# Patient Record
Sex: FEMALE | Race: WHITE | NOT HISPANIC OR LATINO | Employment: UNEMPLOYED | URBAN - METROPOLITAN AREA
[De-identification: names, ages, dates, MRNs, and addresses within clinical notes are randomized per-mention and may not be internally consistent; named-entity substitution may affect disease eponyms.]

---

## 2018-06-07 ENCOUNTER — OFFICE VISIT (OUTPATIENT)
Dept: URGENT CARE | Facility: PHYSICIAN GROUP | Age: 28
End: 2018-06-07
Payer: COMMERCIAL

## 2018-06-07 VITALS
TEMPERATURE: 99.3 F | RESPIRATION RATE: 14 BRPM | OXYGEN SATURATION: 98 % | HEIGHT: 63 IN | WEIGHT: 136 LBS | HEART RATE: 81 BPM | BODY MASS INDEX: 24.1 KG/M2 | DIASTOLIC BLOOD PRESSURE: 70 MMHG | SYSTOLIC BLOOD PRESSURE: 116 MMHG

## 2018-06-07 DIAGNOSIS — R59.0 LYMPHADENOPATHY, PREAURICULAR: ICD-10-CM

## 2018-06-07 DIAGNOSIS — H66.92 ACUTE OTITIS MEDIA, LEFT: Primary | ICD-10-CM

## 2018-06-07 PROCEDURE — 99203 OFFICE O/P NEW LOW 30 MIN: CPT | Performed by: PHYSICIAN ASSISTANT

## 2018-06-07 RX ORDER — AMOXICILLIN AND CLAVULANATE POTASSIUM 875; 125 MG/1; MG/1
1 TABLET, FILM COATED ORAL 2 TIMES DAILY
Qty: 20 TAB | Refills: 0 | Status: SHIPPED | OUTPATIENT
Start: 2018-06-07 | End: 2018-09-04

## 2018-06-07 RX ORDER — IBUPROFEN 800 MG/1
800 TABLET ORAL EVERY 6 HOURS PRN
Refills: 0 | COMMUNITY
Start: 2018-06-01 | End: 2018-09-04

## 2018-06-07 NOTE — PROGRESS NOTES
"Subjective:      Liane Martinez is a 27 y.o. female who presents with Otalgia (patient noticed a lump above left ear 3-4 days ago, no has severe ear pain)    PMH:  has a past medical history of Muscle disorder. She also has no past medical history of Allergy; Arthritis; or OSTEOPOROSIS.  MEDS:   Current Outpatient Prescriptions:   •   MG Tab, Take 800 mg by mouth every 6 hours as needed.  FOR PAIN, Disp: , Rfl: 0  ALLERGIES:   Allergies   Allergen Reactions   • Tylenol Nausea   • Vicodin [Hydrocodone-Acetaminophen] Vomiting     SURGHX: History reviewed. No pertinent surgical history.  SOCHX:  reports that she has never smoked. She has never used smokeless tobacco. She reports that she drinks alcohol. She reports that she does not use drugs.  FH: Reviewed with patient/family. Not pertinent to this complaint.          Otalgia    There is pain in the left ear. This is a new problem. Episode onset: 4 days. The problem occurs constantly. The problem has been gradually worsening. There has been no fever. The pain is at a severity of 8/10. Associated symptoms include headaches. Pertinent negatives include no coughing, ear discharge, hearing loss or rash. She has tried NSAIDs and heat packs for the symptoms. The treatment provided mild relief. There is no history of hearing loss.       Review of Systems   Constitutional: Negative for fever.   HENT: Positive for ear pain. Negative for congestion, ear discharge, hearing loss, sinus pain and tinnitus.    Respiratory: Negative for cough.    Skin: Negative for rash.   Neurological: Positive for headaches.   All other systems reviewed and are negative.         Objective:     /70   Pulse 81   Temp 37.4 °C (99.3 °F)   Resp 14   Ht 1.6 m (5' 3\")   Wt 61.7 kg (136 lb)   SpO2 98%   BMI 24.09 kg/m²      Physical Exam   Constitutional: She is oriented to person, place, and time. She appears well-developed and well-nourished. No distress.   HENT:   Head: " Normocephalic and atraumatic.   Right Ear: Tympanic membrane normal.   Left Ear: Tympanic membrane is injected and bulging. A middle ear effusion is present.   Ears:    Nose: Nose normal.   Mouth/Throat: Uvula is midline and oropharynx is clear and moist.   Eyes: Conjunctivae and EOM are normal. Pupils are equal, round, and reactive to light.   Neck: Normal range of motion. Neck supple.   Cardiovascular: Normal rate.    Pulmonary/Chest: Effort normal.   Musculoskeletal: Normal range of motion.   Lymphadenopathy:        Head (left side): Preauricular adenopathy present.     She has no cervical adenopathy.   Neurological: She is alert and oriented to person, place, and time.   Skin: Skin is warm and dry. Capillary refill takes less than 2 seconds.   Psychiatric: She has a normal mood and affect.   Nursing note and vitals reviewed.       Assessment/Plan:     1. Acute otitis media, left   MG Tab    amoxicillin-clavulanate (AUGMENTIN) 875-125 MG Tab   2. Lymphadenopathy, preauricular   MG Tab    amoxicillin-clavulanate (AUGMENTIN) 875-125 MG Tab        PT can continue OTC medications, increase fluids and rest until symptoms improve.     PT should follow up with PCP in 1-2 days for re-evaluation if symptoms have not improved.  Discussed red flags and reasons to return to UC or ED.  Pt and/or family verbalized understanding of diagnosis and follow up instructions and was offered informational handout on diagnosis.  PT discharged.

## 2018-06-10 ASSESSMENT — ENCOUNTER SYMPTOMS
COUGH: 0
HEADACHES: 1
FEVER: 0
SINUS PAIN: 0

## 2018-09-04 ENCOUNTER — OFFICE VISIT (OUTPATIENT)
Dept: URGENT CARE | Facility: PHYSICIAN GROUP | Age: 28
End: 2018-09-04
Payer: COMMERCIAL

## 2018-09-04 VITALS
WEIGHT: 136 LBS | TEMPERATURE: 98.9 F | DIASTOLIC BLOOD PRESSURE: 68 MMHG | SYSTOLIC BLOOD PRESSURE: 106 MMHG | OXYGEN SATURATION: 98 % | BODY MASS INDEX: 24.09 KG/M2 | RESPIRATION RATE: 18 BRPM | HEART RATE: 104 BPM

## 2018-09-04 DIAGNOSIS — N63.0 BREAST LUMP OR MASS: Primary | ICD-10-CM

## 2018-09-04 PROCEDURE — 99214 OFFICE O/P EST MOD 30 MIN: CPT | Performed by: PHYSICIAN ASSISTANT

## 2018-09-04 RX ORDER — IBUPROFEN 800 MG/1
800 TABLET ORAL EVERY 8 HOURS PRN
Qty: 30 TAB | Refills: 0 | Status: SHIPPED | OUTPATIENT
Start: 2018-09-04 | End: 2018-09-09

## 2018-09-04 ASSESSMENT — ENCOUNTER SYMPTOMS
MYALGIAS: 0
SORE THROAT: 0
COUGH: 0
CHILLS: 0
ANOREXIA: 0
CHANGE IN BOWEL HABIT: 0
FEVER: 0
DIARRHEA: 0
BREAST MASS: 1
ABDOMINAL PAIN: 0
SHORTNESS OF BREATH: 0
CONSTIPATION: 0
VOMITING: 0
WHEEZING: 0
NAUSEA: 0

## 2018-09-05 NOTE — PROGRESS NOTES
Subjective:   Liane Martinez is a 27 y.o. female who presents for Breast Pain (R breast tenderness, redness x1 day)    Breast Mass   This is a new problem. Episode onset: 2 day. The problem occurs constantly. The problem has been gradually improving. Pertinent negatives include no abdominal pain, anorexia, change in bowel habit, chills, congestion, coughing, fever, myalgias, nausea, sore throat or vomiting. Nothing aggravates the symptoms. She has tried nothing for the symptoms.       Exquisitely tender breast mass, redness is improving but pain is still present. Pt currently using contraception with Nexplon.     Review of Systems   Constitutional: Negative for chills, fever and malaise/fatigue.   HENT: Negative for congestion and sore throat.    Respiratory: Negative for cough, shortness of breath and wheezing.    Gastrointestinal: Negative for abdominal pain, anorexia, change in bowel habit, constipation, diarrhea, nausea and vomiting.   Musculoskeletal: Negative for myalgias.   All other systems reviewed and are negative.      PMH:  has a past medical history of Muscle disorder. She also has no past medical history of Allergy; Arthritis; or OSTEOPOROSIS.  MEDS:   Current Outpatient Prescriptions:   •  ibuprofen (MOTRIN) 800 MG Tab, Take 1 Tab by mouth every 8 hours as needed for up to 5 days., Disp: 30 Tab, Rfl: 0  ALLERGIES:   Allergies   Allergen Reactions   • Percocet [Oxycodone-Acetaminophen]    • Tylenol Nausea   • Vicodin [Hydrocodone-Acetaminophen] Vomiting     SURGHX: History reviewed. No pertinent surgical history.  SOCHX:  reports that she has never smoked. She has never used smokeless tobacco. She reports that she drinks alcohol. She reports that she does not use drugs.  Family History   Problem Relation Age of Onset   • Non-contributory Mother    • Non-contributory Father         Objective:   /68   Pulse (!) 104   Temp 37.2 °C (98.9 °F)   Resp 18   Wt 61.7 kg (136 lb)   SpO2 98%    BMI 24.09 kg/m²     Physical Exam   Constitutional: She is oriented to person, place, and time. She appears well-developed and well-nourished. No distress.   HENT:   Head: Normocephalic and atraumatic.   Eyes: Pupils are equal, round, and reactive to light. Conjunctivae are normal.   Cardiovascular: Normal rate and regular rhythm.    Pulmonary/Chest: Effort normal and breath sounds normal.       Neurological: She is alert and oriented to person, place, and time.   Skin: Skin is warm and dry.   Psychiatric: She has a normal mood and affect. Her behavior is normal.   Vitals reviewed.    Urine HCG: neg    Assessment/Plan:     1. Breast lump or mass  ibuprofen (MOTRIN) 800 MG Tab    US-BREAST COMPLETE-RIGHT     Rec'd warm compress and ibuprofen for pain. US pending will discuss final results with pt.     Differential diagnosis, natural history, supportive care discussed. Follow-up with primary care provider within 7-10 days. Red flags and emergency room precautions discussed.  Patient appears understanding of information.

## 2018-09-11 ENCOUNTER — HOSPITAL ENCOUNTER (OUTPATIENT)
Dept: RADIOLOGY | Facility: MEDICAL CENTER | Age: 28
End: 2018-09-11
Attending: PHYSICIAN ASSISTANT
Payer: COMMERCIAL

## 2018-09-11 DIAGNOSIS — N63.0 BREAST LUMP OR MASS: Primary | ICD-10-CM

## 2018-09-11 DIAGNOSIS — N63.0 BREAST LUMP OR MASS: ICD-10-CM

## 2018-09-11 PROCEDURE — 76642 ULTRASOUND BREAST LIMITED: CPT | Mod: RT

## 2018-09-11 NOTE — PROGRESS NOTES
[facility], pt had US of breast and will require further diagnostics including needle bx. Referral to [facility] initiated and patient informed. PCP referral initiated.

## 2018-09-25 ENCOUNTER — OFFICE VISIT (OUTPATIENT)
Dept: HEMATOLOGY ONCOLOGY | Facility: MEDICAL CENTER | Age: 28
End: 2018-09-25
Payer: COMMERCIAL

## 2018-09-25 VITALS
HEIGHT: 63 IN | DIASTOLIC BLOOD PRESSURE: 60 MMHG | TEMPERATURE: 98.9 F | WEIGHT: 122.47 LBS | BODY MASS INDEX: 21.7 KG/M2 | RESPIRATION RATE: 18 BRPM | SYSTOLIC BLOOD PRESSURE: 112 MMHG | HEART RATE: 104 BPM | OXYGEN SATURATION: 98 %

## 2018-09-25 DIAGNOSIS — N63.0 BREAST LUMP IN FEMALE: ICD-10-CM

## 2018-09-25 PROCEDURE — 99205 OFFICE O/P NEW HI 60 MIN: CPT | Performed by: NURSE PRACTITIONER

## 2018-09-25 RX ORDER — TRAMADOL HYDROCHLORIDE 50 MG/1
50 TABLET ORAL EVERY 4 HOURS PRN
COMMUNITY
End: 2022-02-22

## 2018-09-25 RX ORDER — CYCLOBENZAPRINE HCL 5 MG
5-10 TABLET ORAL 3 TIMES DAILY PRN
COMMUNITY
End: 2022-12-20

## 2018-09-25 ASSESSMENT — ENCOUNTER SYMPTOMS
NAUSEA: 0
CONSTIPATION: 0
SORE THROAT: 0
COUGH: 0
PALPITATIONS: 0
DIARRHEA: 0
INSOMNIA: 1
HEADACHES: 0
SHORTNESS OF BREATH: 0
WHEEZING: 0
FEVER: 0
WEIGHT LOSS: 0
CHILLS: 0
VOMITING: 0
NERVOUS/ANXIOUS: 1
DIZZINESS: 0

## 2018-09-25 ASSESSMENT — PAIN SCALES - GENERAL: PAINLEVEL: 4=SLIGHT-MODERATE PAIN

## 2018-09-25 NOTE — PROGRESS NOTES
Subjective:      Liane Martinez is a 28 y.o. female who presents with New Patient ([facility] New-Breast lump/Tiffanie Elena)        HPI    Patient referred to me, Intake Oncology Coordinator by Tiffanie Elena PA-C in Urgent Care for breast lump.  Patient is accompanied by her mother for today's visit.    Patient stated she was getting out of the shower one day and noticed a lump on her right breast.  She noticed approximately few weeks ago.  Initially she stated that it did hurt but there is no pain at this time.  There was some mild dimpling per patient.  She stated she was examined by a provider in the past but is not certain as to how long ago.  Based on this she was seen in the urgent care where she underwent examination and was sent for an ultrasound of the right breast.  Ultrasound shows a palpable lump that corresponds with a probable complicated cyst with a solid mass.  According to the reading radiologist due to the painful nature of this finding the results are somewhat inconclusive and recommendation for further evaluation.  Ultrasound was category 4A.  I personally reviewed the imaging report and films in detail as well as reviewed the patient and her mother today.    Overall patient is fairly healthy.  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  She is followed closely by [facility].  She does states she has some fatigue, but denies any fevers, chills or weight loss.  She denies any coughing, wheezing or shortness of breath.  She denies any chest pain, heart palpitations or swelling in her legs.  She is having normal formed bowel movements per her routine and denies any nausea or vomiting.     Obstetrics:   Age of first birth: N/A  Breast feed: N/A  Menarche age: 10  Birth control taken: Nexplanon x 3 years, Nuvaring x4-5 years  Menopause age: N/A  HRT: N/A    Please see past medical and surgical history below.    She did state that she does have a paternal grandmother that  "had ovarian cancer.    Patient is a never cigarette smoker.  She does have chronic pain and smokes marijuana daily.    Allergies   Allergen Reactions   • Percocet [Oxycodone-Acetaminophen]    • Tylenol Nausea   • Vicodin [Hydrocodone-Acetaminophen] Vomiting     No current outpatient prescriptions on file prior to visit.     No current facility-administered medications on file prior to visit.      Past Medical History:   Diagnosis Date   • Muscle disorder     \"hyperflexibility\" of joints      Past Surgical History:   Procedure Laterality Date   • DENTAL EXTRACTION(S)      wisdom     Family History   Problem Relation Age of Onset   • Non-contributory Mother    • Non-contributory Father    • Cancer Paternal Grandmother         Ovarian     Social History     Social History   • Marital status: Single     Spouse name: N/A   • Number of children: 0   • Years of education: N/A     Social History Main Topics   • Smoking status: Never Smoker   • Smokeless tobacco: Never Used   • Alcohol use Yes      Comment: occ   • Drug use: Yes     Types: Marijuana      Comment: for chronic pain    • Sexual activity: Yes     Partners: Male     Other Topics Concern   • Not on file     Social History Narrative   • No narrative on file       Review of Systems   Constitutional: Positive for malaise/fatigue. Negative for chills, fever and weight loss.   HENT: Negative for congestion and sore throat.    Respiratory: Negative for cough, shortness of breath and wheezing.    Cardiovascular: Negative for chest pain, palpitations and leg swelling.   Gastrointestinal: Negative for constipation, diarrhea, nausea and vomiting.   Genitourinary: Negative for dysuria.   Musculoskeletal:        Chronic left sided pain - on pain meds and sees specialist. New onset of right arm pain the last few weeks.   Skin: Negative for itching and rash.   Neurological: Negative for dizziness and headaches.   Psychiatric/Behavioral: The patient is nervous/anxious and has " "insomnia (chronic).           Objective:     /60 (BP Location: Right arm, Patient Position: Sitting, BP Cuff Size: Adult)   Pulse (!) 104   Temp 37.2 °C (98.9 °F) (Temporal)   Resp 18   Ht 1.6 m (5' 2.99\")   Wt 55.6 kg (122 lb 7.5 oz)   LMP 07/05/2018 (Within Days)   SpO2 98%   Breastfeeding? No   BMI 21.70 kg/m²      Physical Exam   Constitutional: She is oriented to person, place, and time. She appears well-developed and well-nourished. No distress.   HENT:   Head: Normocephalic and atraumatic.   Mouth/Throat: Oropharynx is clear and moist. No oropharyngeal exudate.   Eyes: Pupils are equal, round, and reactive to light. Conjunctivae and EOM are normal. Right eye exhibits no discharge. Left eye exhibits no discharge. No scleral icterus.   Neck: Normal range of motion. Neck supple. No thyromegaly present.   Cardiovascular: Normal rate, regular rhythm, normal heart sounds and intact distal pulses.  Exam reveals no gallop and no friction rub.    No murmur heard.  Pulmonary/Chest: Effort normal and breath sounds normal. No respiratory distress. She has no wheezes. Right breast exhibits tenderness. Right breast exhibits no inverted nipple, no mass, no nipple discharge and no skin change. Left breast exhibits no inverted nipple, no mass, no nipple discharge, no skin change and no tenderness.       Abdominal: Soft. Bowel sounds are normal. She exhibits no distension. There is no tenderness.   Musculoskeletal: Normal range of motion. She exhibits no edema or tenderness.   Lymphadenopathy:        Head (right side): No submental, no submandibular, no tonsillar, no preauricular, no posterior auricular and no occipital adenopathy present.        Head (left side): No submental, no submandibular, no tonsillar, no preauricular, no posterior auricular and no occipital adenopathy present.     She has no cervical adenopathy.        Right cervical: No superficial cervical, no deep cervical and no posterior cervical " adenopathy present.       Left cervical: No superficial cervical, no deep cervical and no posterior cervical adenopathy present.        Right: No supraclavicular adenopathy present.        Left: No supraclavicular adenopathy present.   Neurological: She is alert and oriented to person, place, and time.   Skin: Skin is warm and dry. No rash noted. She is not diaphoretic. No erythema. No pallor.   Psychiatric: She has a normal mood and affect. Her behavior is normal.   Vitals reviewed.         Us-breast Limited-right    Result Date: 9/11/2018 9/11/2018 10:44 AM HISTORY/REASON FOR EXAM:  Lump/Mass Painful palpable lump in the right breast 11:00 position for one week TECHNIQUE/EXAM DESCRIPTION AND NUMBER OF VIEWS: Right breast ultrasound. COMPARISON:   None FINDINGS:      Targeted ultrasound evaluation of the area of concern demonstrates a well-circumscribed hypoechoic mass with increased through transmission. This likely represents a complicated cyst with thick internal fluid. A solid mass such as a fibroadenoma would seem less likely. No axillary lymphadenopathy identified.     Palpable lump corresponds with probable complicated cyst with a solid mass such as a fibroadenoma seeming less likely. Due to painful nature of probable cyst as well as somewhat inconclusive findings recommend ultrasound-guided aspiration and potentially  biopsy if the apparent complicated cyst turns out to be solid. These results were given to the patient at the time of visit. Category 4A - Finding needing intervention but with a low suspicion for malignancy.       Assessment/Plan:     1. Breast lump in female  US-NEEDLE BX-BREAST INITIAL LESION     Plan  1.  Patient with a palpable lump of the right breast, with an ultrasound that shows it to be a probable complicated cyst with a solid mass recommending further evaluation.  According to the reading radiologist the findings are somewhat inconclusive and recommend for biopsy.  I discussed this  in detail with the patient and her mother today and recommended to proceed with a biopsy of the lump.  Patient did verbalize understanding and is in agreement the plan.  She is scheduled to have her biopsy on Thursday, September 27 and will follow up with me in the clinic on Monday, October 1 to review the results.    Spent 60 minutes in direct, face-to-face patient contact in which greater than 50% of the visit was spent counseling and coordinating of care.       Please note that this dictation was created using voice recognition software. I have made every reasonable attempt to correct obvious errors, but I expect that there are errors of grammar and possibly content that I did not discover before finalizing the note.

## 2018-09-27 ENCOUNTER — HOSPITAL ENCOUNTER (OUTPATIENT)
Dept: RADIOLOGY | Facility: MEDICAL CENTER | Age: 28
End: 2018-09-27
Attending: NURSE PRACTITIONER
Payer: COMMERCIAL

## 2018-09-27 DIAGNOSIS — N63.0 BREAST LUMP IN FEMALE: ICD-10-CM

## 2018-09-27 PROCEDURE — 19083 BX BREAST 1ST LESION US IMAG: CPT

## 2018-09-27 PROCEDURE — 88305 TISSUE EXAM BY PATHOLOGIST: CPT

## 2018-10-01 ENCOUNTER — OFFICE VISIT (OUTPATIENT)
Dept: HEMATOLOGY ONCOLOGY | Facility: MEDICAL CENTER | Age: 28
End: 2018-10-01
Payer: COMMERCIAL

## 2018-10-01 VITALS
HEIGHT: 63 IN | TEMPERATURE: 98.6 F | WEIGHT: 123.79 LBS | SYSTOLIC BLOOD PRESSURE: 114 MMHG | HEART RATE: 72 BPM | DIASTOLIC BLOOD PRESSURE: 64 MMHG | OXYGEN SATURATION: 100 % | RESPIRATION RATE: 18 BRPM | BODY MASS INDEX: 21.93 KG/M2

## 2018-10-01 DIAGNOSIS — N63.0 BREAST LUMP: ICD-10-CM

## 2018-10-01 PROCEDURE — 99213 OFFICE O/P EST LOW 20 MIN: CPT | Performed by: NURSE PRACTITIONER

## 2018-10-01 ASSESSMENT — PAIN SCALES - GENERAL: PAINLEVEL: 4=SLIGHT-MODERATE PAIN

## 2018-10-01 NOTE — Clinical Note
Hi there - this patient is establishing with you this Friday and wanted you to know that I have been seeing her and working her up for breast nodule which showed to be fibroadenoma. Just so you were aware when you see her.

## 2018-10-01 NOTE — PROGRESS NOTES
"Subjective:      Liane Martinez is a 28 y.o. female who presents for Follow-Up (Biopsy Results).        HPI    Patient seen today in follow-up for breast biopsy results.  She presents accompanied by her mother for today's visit.    She did seem to tolerate the biopsy well.  She does have Steri-Strips placed with some very mild bruising around the incision site.  She stated she had some tenderness with the biopsy but is doing well at this time.    Biopsy results showed a right breast ultrasound biopsy at the 11 o'clock position, 2 cm from the nipple show fragments of fibroadenoma at least measuring 6.5 mm in breath with surrounding dense fibrous breast tissue.  Biopsy specimen was negative for atypia and malignancy.    I personally reviewed the results in detail as well as reviewed with the patient and her mother today.  Patient was happy with the results of the pathology report.    Allergies   Allergen Reactions   • Percocet [Oxycodone-Acetaminophen]    • Tylenol Nausea   • Vicodin [Hydrocodone-Acetaminophen] Vomiting     Current Outpatient Prescriptions on File Prior to Visit   Medication Sig Dispense Refill   • etonogestrel (NEXPLANON) 68 MG Implant implant Inject 1 Each as instructed Once.     • cyclobenzaprine (FLEXERIL) 5 MG tablet Take 5-10 mg by mouth 3 times a day as needed.     • tramadol (ULTRAM) 50 MG Tab Take 50 mg by mouth every four hours as needed.       No current facility-administered medications on file prior to visit.      ROS       Objective:     /64 (BP Location: Right arm, Patient Position: Sitting, BP Cuff Size: Adult)   Pulse 72   Temp 37 °C (98.6 °F) (Temporal)   Resp 18   Ht 1.6 m (5' 2.99\")   Wt 56.1 kg (123 lb 12.6 oz)   LMP  (LMP Unknown)   SpO2 100%   Breastfeeding? Unknown   BMI 21.93 kg/m²      Physical Exam   Constitutional: She is oriented to person, place, and time. She appears well-developed and well-nourished.   Cardiovascular: Normal rate.  "   Pulmonary/Chest: Effort normal.   Musculoskeletal: Normal range of motion.   Neurological: She is alert and oriented to person, place, and time.   Skin: Skin is warm and dry. She is not diaphoretic.   Incision site of the right breast noted to have Steri-Strips with some mild bruising around it.  Incision site does appear to be clean.   Vitals reviewed.         FINAL DIAGNOSIS:    A. Right breast ultrasound biopsy mass, 11 o'clock, 2 cm from nipple:         Fragments of fibroadenoma, at least 6.5 mm in breadth, and          surrounding dense fibrous breast tissue.         Negative for atypia and malignancy.       Assessment/Plan:     1. Breast lump       Plan  1.  Patient with right breast ultrasound biopsy shows fragments of fibroadenoma measuring at least 6.5 mm in breadth with surrounding dense fibrous tissue noted.  There is no atypia or malignancy seen.  I did speak personally to Dr. Cox, breast surgeon who stated recommendation at this time would be a repeat ultrasound in approximately 6 months.  On ultrasound of the nodule measured approximately 1.29 cm in size.  I talked to the patient with regards to my conversation with Dr. Cox who verbalized understanding and is in agreement at this time.  I also let her know that should she start to notice the nodule grow or become too bothersome we can always refer her to Dr. Cox, as Dr. Cox has agreed to see the patient if requested.  At this time we will wait 6 months and have patient have a repeat ultrasound in approximately end of March 2019 for evaluation to confirm stability.  Patient is in agreement with the plan.

## 2018-10-05 ENCOUNTER — OFFICE VISIT (OUTPATIENT)
Dept: MEDICAL GROUP | Facility: MEDICAL CENTER | Age: 28
End: 2018-10-05
Payer: COMMERCIAL

## 2018-10-05 VITALS
WEIGHT: 117 LBS | DIASTOLIC BLOOD PRESSURE: 66 MMHG | RESPIRATION RATE: 16 BRPM | SYSTOLIC BLOOD PRESSURE: 102 MMHG | HEIGHT: 63 IN | TEMPERATURE: 97.8 F | OXYGEN SATURATION: 97 % | HEART RATE: 84 BPM | BODY MASS INDEX: 20.73 KG/M2

## 2018-10-05 DIAGNOSIS — Q79.60 EDS (EHLERS-DANLOS SYNDROME): ICD-10-CM

## 2018-10-05 DIAGNOSIS — Z97.5 NEXPLANON IN PLACE: ICD-10-CM

## 2018-10-05 DIAGNOSIS — F41.1 GAD (GENERALIZED ANXIETY DISORDER): ICD-10-CM

## 2018-10-05 DIAGNOSIS — D24.1 FIBROADENOMA OF RIGHT BREAST: ICD-10-CM

## 2018-10-05 PROCEDURE — 99213 OFFICE O/P EST LOW 20 MIN: CPT | Performed by: NURSE PRACTITIONER

## 2018-10-05 NOTE — LETTER
[facility]  KACY MccoyP.R.N.  [address] [address]  [place] [address]  Fax: [phone]   Authorization for Release/Disclosure of   Protected Health Information   Name: DEDRA CRABTREE : 1990 SSN: [id number]   Address: [address] [facility] [address] Phone:    [phone] (home)    I authorize the entity listed below to release/disclose the PHI below to:   [facility]/Joaquina Higgins A.P.R.NJoshua and KACY MccoyP.R.JOHNNY   Provider or Entity Name:  [facility]   Address   City, State, Zip   Phone:      Fax:     Reason for request: continuity of care   Information to be released:    [  ] LAST COLONOSCOPY,  including any PATH REPORT and follow-up  [  ] LAST FIT/COLOGUARD RESULT [  ] LAST DEXA  [  ] LAST MAMMOGRAM  [ x ] LAST PAP  [  ] LAST LABS [  ] RETINA EXAM REPORT  [  ] IMMUNIZATION RECORDS  [  ] Release all info      [  ] Check here and initial the line next to each item to release ALL health information INCLUDING  _____ Care and treatment for drug and / or alcohol abuse  _____ HIV testing, infection status, or AIDS  _____ Genetic Testing    DATES OF SERVICE OR TIME PERIOD TO BE DISCLOSED: _____________  I understand and acknowledge that:  * This Authorization may be revoked at any time by you in writing, except if your health information has already been used or disclosed.  * Your health information that will be used or disclosed as a result of you signing this authorization could be re-disclosed by the recipient. If this occurs, your re-disclosed health information may no longer be protected by State or Federal laws.  * You may refuse to sign this Authorization. Your refusal will not affect your ability to obtain treatment.  * This Authorization becomes effective upon signing and will  on (date) __________.      If no date is indicated, this Authorization will  one (1) year from the signature date.    Name: Dedra Crabtree    Signature:   Date:     10/5/2018       PLEASE FAX REQUESTED RECORDS BACK TO: [phone]

## 2018-12-21 ENCOUNTER — OFFICE VISIT (OUTPATIENT)
Dept: MEDICAL GROUP | Facility: MEDICAL CENTER | Age: 28
End: 2018-12-21
Payer: COMMERCIAL

## 2018-12-21 VITALS
HEART RATE: 102 BPM | TEMPERATURE: 99.5 F | DIASTOLIC BLOOD PRESSURE: 70 MMHG | BODY MASS INDEX: 22.08 KG/M2 | SYSTOLIC BLOOD PRESSURE: 98 MMHG | RESPIRATION RATE: 16 BRPM | OXYGEN SATURATION: 99 % | HEIGHT: 62 IN | WEIGHT: 120 LBS

## 2018-12-21 DIAGNOSIS — F32.9 REACTIVE DEPRESSION: ICD-10-CM

## 2018-12-21 DIAGNOSIS — F41.0 PANIC ATTACKS: ICD-10-CM

## 2018-12-21 PROCEDURE — 99214 OFFICE O/P EST MOD 30 MIN: CPT | Performed by: NURSE PRACTITIONER

## 2018-12-21 RX ORDER — ALPRAZOLAM 0.25 MG/1
.25-.5 TABLET ORAL NIGHTLY PRN
Qty: 30 TAB | Refills: 0 | Status: SHIPPED | OUTPATIENT
Start: 2018-12-21 | End: 2019-01-20

## 2018-12-21 RX ORDER — FLUOXETINE 10 MG/1
CAPSULE ORAL
Qty: 60 CAP | Refills: 1 | Status: SHIPPED | OUTPATIENT
Start: 2018-12-21 | End: 2019-01-23 | Stop reason: SDUPTHER

## 2018-12-21 NOTE — PATIENT INSTRUCTIONS
Fluoxetine capsules or tablets (Depression/Mood Disorders)  What is this medicine?  FLUOXETINE (floo OX e teen) belongs to a class of drugs known as selective serotonin reuptake inhibitors (SSRIs). It helps to treat mood problems such as depression, obsessive compulsive disorder, and panic attacks. It can also treat certain eating disorders.  This medicine may be used for other purposes; ask your health care provider or pharmacist if you have questions.  COMMON BRAND NAME(S): Prozac  What should I tell my health care provider before I take this medicine?  They need to know if you have any of these conditions:  -bipolar disorder or a family history of bipolar disorder  -bleeding disorders  -glaucoma  -heart disease  -liver disease  -low levels of sodium in the blood  -seizures  -suicidal thoughts, plans, or attempt; a previous suicide attempt by you or a family member  -take MAOIs like Carbex, Eldepryl, Marplan, Nardil, and Parnate  -take medicines that treat or prevent blood clots  -thyroid disease  -an unusual or allergic reaction to fluoxetine, other medicines, foods, dyes, or preservatives  -pregnant or trying to get pregnant  -breast-feeding  How should I use this medicine?  Take this medicine by mouth with a glass of water. Follow the directions on the prescription label. You can take this medicine with or without food. Take your medicine at regular intervals. Do not take it more often than directed. Do not stop taking this medicine suddenly except upon the advice of your doctor. Stopping this medicine too quickly may cause serious side effects or your condition may worsen.  A special MedGuide will be given to you by the pharmacist with each prescription and refill. Be sure to read this information carefully each time.  Talk to your pediatrician regarding the use of this medicine in children. While this drug may be prescribed for children as young as 7 years for selected conditions, precautions do  apply.  Overdosage: If you think you have taken too much of this medicine contact a poison control center or emergency room at once.  NOTE: This medicine is only for you. Do not share this medicine with others.  What if I miss a dose?  If you miss a dose, skip the missed dose and go back to your regular dosing schedule. Do not take double or extra doses.  What may interact with this medicine?  Do not take this medicine with any of the following medications:  -other medicines containing fluoxetine, like Sarafem or Symbyax  -cisapride  -linezolid  -MAOIs like Carbex, Eldepryl, Marplan, Nardil, and Parnate  -methylene blue (injected into a vein)  -pimozide  -thioridazine  This medicine may also interact with the following medications:  -alcohol  -amphetamines  -aspirin and aspirin-like medicines  -carbamazepine  -certain medicines for depression, anxiety, or psychotic disturbances  -certain medicines for migraine headaches like almotriptan, eletriptan, frovatriptan, naratriptan, rizatriptan, sumatriptan, zolmitriptan  -digoxin  -diuretics  -fentanyl  -flecainide  -furazolidone  -isoniazid  -lithium  -medicines for sleep  -medicines that treat or prevent blood clots like warfarin, enoxaparin, and dalteparin  -NSAIDs, medicines for pain and inflammation, like ibuprofen or naproxen  -phenytoin  -procarbazine  -propafenone  -rasagiline  -ritonavir  -supplements like Ramiro's wort, kava kava, valerian  -tramadol  -tryptophan  -vinblastine  This list may not describe all possible interactions. Give your health care provider a list of all the medicines, herbs, non-prescription drugs, or dietary supplements you use. Also tell them if you smoke, drink alcohol, or use illegal drugs. Some items may interact with your medicine.  What should I watch for while using this medicine?  Tell your doctor if your symptoms do not get better or if they get worse. Visit your doctor or health care professional for regular checks on your  progress. Because it may take several weeks to see the full effects of this medicine, it is important to continue your treatment as prescribed by your doctor.  Patients and their families should watch out for new or worsening thoughts of suicide or depression. Also watch out for sudden changes in feelings such as feeling anxious, agitated, panicky, irritable, hostile, aggressive, impulsive, severely restless, overly excited and hyperactive, or not being able to sleep. If this happens, especially at the beginning of treatment or after a change in dose, call your health care professional.  You may get drowsy or dizzy. Do not drive, use machinery, or do anything that needs mental alertness until you know how this medicine affects you. Do not stand or sit up quickly, especially if you are an older patient. This reduces the risk of dizzy or fainting spells. Alcohol may interfere with the effect of this medicine. Avoid alcoholic drinks.  Your mouth may get dry. Chewing sugarless gum or sucking hard candy, and drinking plenty of water may help. Contact your doctor if the problem does not go away or is severe.  This medicine may affect blood sugar levels. If you have diabetes, check with your doctor or health care professional before you change your diet or the dose of your diabetic medicine.  What side effects may I notice from receiving this medicine?  Side effects that you should report to your doctor or health care professional as soon as possible:  -allergic reactions like skin rash, itching or hives, swelling of the face, lips, or tongue  -anxious  -black, tarry stools  -breathing problems  -changes in vision  -confusion  -elevated mood, decreased need for sleep, racing thoughts, impulsive behavior  -eye pain  -fast, irregular heartbeat  -feeling faint or lightheaded, falls  -feeling agitated, angry, or irritable  -hallucination, loss of contact with reality  -loss of balance or coordination  -loss of memory  -painful  or prolonged erections  -restlessness, pacing, inability to keep still  -seizures  -stiff muscles  -suicidal thoughts or other mood changes  -trouble sleeping  -unusual bleeding or bruising  -unusually weak or tired  -vomiting  Side effects that usually do not require medical attention (report to your doctor or health care professional if they continue or are bothersome):  -change in appetite or weight  -change in sex drive or performance  -diarrhea  -dry mouth  -headache  -increased sweating  -nausea  -tremors  This list may not describe all possible side effects. Call your doctor for medical advice about side effects. You may report side effects to FDA at 8-994-JHI-2994.  Where should I keep my medicine?  Keep out of the reach of children.  Store at room temperature between 15 and 30 degrees C (59 and 86 degrees F). Throw away any unused medicine after the expiration date.  NOTE: This sheet is a summary. It may not cover all possible information. If you have questions about this medicine, talk to your doctor, pharmacist, or health care provider.  © 2018 [employer] (2017-05-20 15:55:27)

## 2018-12-21 NOTE — PROGRESS NOTES
cc: Panic attacks and sadness      Subjective:     HPI:     Liane Martinez is a 28 y.o. female here to discuss the evaluation and management of:    Patient comes in today with complaint feelings of sadness.  Patient states that approximately 2 months ago she did end a relationship with a very close friend/romantic interest.  Ever since then she has been having feelings of sadness, despair, grief and panic attacks.  She has been crying throughout the day, and waking up in the middle night crying.  She was having a hard time leaving her room, she has been eating junk food and has gained some weight.  She states that she is never really felt like this before.  She did talk to a counselor online and she understands that she is going through grieving process right now she did lose a friend/romantic interest.  She is having a hard time functioning and would like to start a medication.  She does not want to hurt herself or anyone else.      ROS:  Denies any Headache, Blurred Vision, Confusion, Chest pain,  Shortness of breath,  Abdominal pain, Changes of bowel or bladder, Lower ext edema, Fevers, Nights sweats, Weight Changes, Focal weakness or numbness.  And all other systems are negative.        Current Outpatient Prescriptions:   •  ALPRAZolam (XANAX) 0.25 MG Tab, Take 1-2 Tabs by mouth at bedtime as needed for Sleep or Anxiety for up to 30 days., Disp: 30 Tab, Rfl: 0  •  FLUoxetine (PROZAC) 10 MG Cap, Take one capsule daily for one week then increase to two capsules daily thereafter, Disp: 60 Cap, Rfl: 1  •  etonogestrel (NEXPLANON) 68 MG Implant implant, Inject 1 Each as instructed Once., Disp: , Rfl:   •  cyclobenzaprine (FLEXERIL) 5 MG tablet, Take 5-10 mg by mouth 3 times a day as needed., Disp: , Rfl:   •  tramadol (ULTRAM) 50 MG Tab, Take 50 mg by mouth every four hours as needed., Disp: , Rfl:     Allergies   Allergen Reactions   • Percocet [Oxycodone-Acetaminophen]    • Tylenol Nausea   • Vicodin  "[Hydrocodone-Acetaminophen] Vomiting       Past Medical History:   Diagnosis Date   • Breast lump     Fibroadenoma in right breast 9-2018   • Nitesh-Danlos syndrome    • Muscle disorder     \"hyperflexibility\" of joints      Past Surgical History:   Procedure Laterality Date   • DENTAL EXTRACTION(S)      wisdom   • SEPTOPLASTY      Deviated septum repair by Dr. Jaron Guillermo     Family History   Problem Relation Age of Onset   • Cancer Mother         skin   • Hypertension Mother    • Cancer Father         skin   • Cancer Paternal Grandmother         Ovarian     Social History     Social History   • Marital status: Single     Spouse name: N/A   • Number of children: 0   • Years of education: N/A     Occupational History   • Not on file.     Social History Main Topics   • Smoking status: Never Smoker   • Smokeless tobacco: Never Used   • Alcohol use Yes      Comment: occ   • Drug use: Yes     Types: Marijuana      Comment: for chronic pain    • Sexual activity: Yes     Partners: Male     Other Topics Concern   • Not on file     Social History Narrative   • No narrative on file       Objective:     Vitals: BP (!) 98/70   Pulse (!) 102   Temp 37.5 °C (99.5 °F)   Resp 16   Ht 1.575 m (5' 2\")   Wt 54.4 kg (120 lb)   SpO2 99%   BMI 21.95 kg/m²    General: Alert, pleasant, crying,   HEENT: Normocephalic.    Skin: Warm, dry, no rashes.  Musculoskeletal: Gait is normal.  Moves all extremities well.  Extremities: No leg edema. No discoloration  Neurological: No tremors, sensation grossly intact, gait is normal,   Psych:  Affect/mood is sad, crying, judgement is good, memory is intact, grooming is appropriate.    Assessment/Plan:     Liane was seen today for anxiety.    Diagnoses and all orders for this visit:    Panic attacks  Patient has been having some panic attacks throughout the day and at nighttime.  Having a hard time sleeping due to this. Have discussed with her that we can trial a small dose of Xanax.  Number 30 " tablets given to patient.  She is advised against the side effects, to avoid alcohol and driving on this medication.  She will follow back up in 4-6 weeks.  -     ALPRAZolam (XANAX) 0.25 MG Tab; Take 1-2 Tabs by mouth at bedtime as needed for Sleep or Anxiety for up to 30 days.    Reactive depression  Not well controlled at this time right now patient is grieving from the loss of her friend/somewhat romantic interest.  She has been crying and having feelings of despair, staying in her room and has gained some weight.  Have reviewed medications with the patient and we have selected fluoxetine.  She will follow back up in 4-6 weeks.  She was start with 10 mg for 1 week and then increase it to 20 mg daily.  She will continue counseling  -     FLUoxetine (PROZAC) 10 MG Cap; Take one capsule daily for one week then increase to two capsules daily thereafter        Return in about 6 weeks (around 2/1/2019).        Joaquina SURESH.

## 2019-01-23 ENCOUNTER — OFFICE VISIT (OUTPATIENT)
Dept: MEDICAL GROUP | Facility: MEDICAL CENTER | Age: 29
End: 2019-01-23
Payer: COMMERCIAL

## 2019-01-23 VITALS
OXYGEN SATURATION: 99 % | HEART RATE: 118 BPM | SYSTOLIC BLOOD PRESSURE: 110 MMHG | TEMPERATURE: 98.4 F | WEIGHT: 125 LBS | RESPIRATION RATE: 16 BRPM | DIASTOLIC BLOOD PRESSURE: 70 MMHG | HEIGHT: 62 IN | BODY MASS INDEX: 23 KG/M2

## 2019-01-23 DIAGNOSIS — F32.9 REACTIVE DEPRESSION: ICD-10-CM

## 2019-01-23 DIAGNOSIS — J04.0 LARYNGITIS: ICD-10-CM

## 2019-01-23 DIAGNOSIS — F41.1 GAD (GENERALIZED ANXIETY DISORDER): ICD-10-CM

## 2019-01-23 PROCEDURE — 99214 OFFICE O/P EST MOD 30 MIN: CPT | Performed by: NURSE PRACTITIONER

## 2019-01-23 RX ORDER — ALPRAZOLAM 0.25 MG/1
0.25 TABLET ORAL NIGHTLY PRN
COMMUNITY
End: 2019-01-23 | Stop reason: SDUPTHER

## 2019-01-23 RX ORDER — ALPRAZOLAM 0.25 MG/1
0.25 TABLET ORAL NIGHTLY PRN
Qty: 30 TAB | Refills: 1 | Status: SHIPPED | OUTPATIENT
Start: 2019-01-23 | End: 2019-05-28 | Stop reason: SDUPTHER

## 2019-01-23 RX ORDER — AMOXICILLIN 500 MG/1
500 CAPSULE ORAL 2 TIMES DAILY
Qty: 20 CAP | Refills: 0 | Status: SHIPPED | OUTPATIENT
Start: 2019-01-23 | End: 2019-05-28

## 2019-01-23 RX ORDER — FLUOXETINE HYDROCHLORIDE 20 MG/1
20 CAPSULE ORAL DAILY
Qty: 90 CAP | Refills: 1 | Status: SHIPPED | OUTPATIENT
Start: 2019-01-23 | End: 2019-05-28

## 2019-01-23 NOTE — PROGRESS NOTES
cc:  Follow up anxiety and depression/laryngitis      Subjective:     HPI:     Liane Martinez is a 28 y.o. female here to discuss the evaluation and management of:    Patient is here to follow-up on her depression and anxiety and panic attacks.  Patient states that she is been taking the Prozac 20 mg and tolerating this well.  Only complaint would be dry mouth.  States that her depression and anxiety has improved.  She is also been taking the Xanax as needed.  States she has been taking about 3 times a week at this time.  Patient states that she does still feel tearful and sad and depressed however it has been improving since her last visit.  She wishes to continue on both medications.      Laryngitis  She also states that about 5 days ago she started with losing her voice.  States that she was not around any tobacco smoke, loud music or excessively using her voice.  Denies any runny nose.  She does state that she has a dry cough and her chest hurts, excessive fatigue. denies any fevers, chills, nausea, vomiting.  Denies any ear pain.  She is concerned because she has had bronchitis in the past.        ROS:  Denies any Headache, Blurred Vision, Confusion, Chest pain,  Shortness of breath,  Abdominal pain, Changes of bowel or bladder, Lower ext edema, Fevers, Nights sweats, Weight Changes, Focal weakness or numbness.  And all other systems are negative.  Cough, hoarse voice, fatigue        Current Outpatient Prescriptions:   •  ALPRAZolam (XANAX) 0.25 MG Tab, Take 1 Tab by mouth at bedtime as needed for Sleep or Anxiety for up to 30 days., Disp: 30 Tab, Rfl: 1  •  FLUoxetine (PROZAC) 20 MG Cap, Take 1 Cap by mouth every day., Disp: 90 Cap, Rfl: 1  •  amoxicillin (AMOXIL) 500 MG Cap, Take 1 Cap by mouth 2 times a day., Disp: 20 Cap, Rfl: 0  •  etonogestrel (NEXPLANON) 68 MG Implant implant, Inject 1 Each as instructed Once., Disp: , Rfl:   •  cyclobenzaprine (FLEXERIL) 5 MG tablet, Take 5-10 mg by mouth 3  "times a day as needed., Disp: , Rfl:   •  tramadol (ULTRAM) 50 MG Tab, Take 50 mg by mouth every four hours as needed., Disp: , Rfl:     Allergies   Allergen Reactions   • Percocet [Oxycodone-Acetaminophen]    • Tylenol Nausea   • Vicodin [Hydrocodone-Acetaminophen] Vomiting       Past Medical History:   Diagnosis Date   • Breast lump     Fibroadenoma in right breast 9-2018   • Nitesh-Danlos syndrome    • Muscle disorder     \"hyperflexibility\" of joints      Past Surgical History:   Procedure Laterality Date   • DENTAL EXTRACTION(S)      wisdom   • SEPTOPLASTY      Deviated septum repair by Dr. Jaron Guillermo     Family History   Problem Relation Age of Onset   • Cancer Mother         skin   • Hypertension Mother    • Cancer Father         skin   • Cancer Paternal Grandmother         Ovarian     Social History     Social History   • Marital status: Single     Spouse name: N/A   • Number of children: 0   • Years of education: N/A     Occupational History   • Not on file.     Social History Main Topics   • Smoking status: Never Smoker   • Smokeless tobacco: Never Used   • Alcohol use Yes      Comment: occ   • Drug use: Yes     Types: Marijuana      Comment: for chronic pain    • Sexual activity: Yes     Partners: Male     Other Topics Concern   • Not on file     Social History Narrative   • No narrative on file       Objective:     Vitals: /70   Pulse (!) 118   Temp 36.9 °C (98.4 °F)   Resp 16   Ht 1.575 m (5' 2\")   Wt 56.7 kg (125 lb)   SpO2 99%   BMI 22.86 kg/m²    General: Alert, pleasant, NAD  HEENT: Normocephalic.  Bilateral hypertrophic turbinates, +rhinorrhea. Tympanic membranes pearly, scant serous fluid with retracted TM.Oropharynx non-erythematous, mucous membranes moist.  Neck supple.  No thyromegaly or masses palpated. No cervical or supraclavicular lymphadenopathy.  Heart: Tachycardic heart rate at 118  S1 and S2 normal.  No murmurs appreciated.  Respiratory: Normal respiratory effort.  Clear to " auscultation bilaterally.  Skin: Warm, dry, no rashes.  Musculoskeletal: Gait is normal.  Moves all extremities well.  Extremities: No leg edema. No discoloration  Neurological: No tremors, sensation grossly intact, gait is normal,   Psych:  Affect/mood is normal, judgement is good, memory is intact, grooming is appropriate.    Assessment/Plan:     Liane was seen today for follow-up and laryngitis.    Diagnoses and all orders for this visit:    STEVE (generalized anxiety disorder)  Improving.  Has been using Xanax approximately 3 days a week.  Denies any overt side effects from his medication.  She wishes to continue taking this as she continues to have anxiety and panic attacks.  Overall she states they are improving.Narx check completed.   -     ALPRAZolam (XANAX) 0.25 MG Tab; Take 1 Tab by mouth at bedtime as needed for Sleep or Anxiety for up to 30 days.    Reactive depression  Improving.  Denies any thoughts of suicide.  Wishes to continue on the Prozac 20 mg daily.  We will have her continue this dose and she will follow back up in 4-6 weeks.  -     FLUoxetine (PROZAC) 20 MG Cap; Take 1 Cap by mouth every day.    Laryngitis  Discussed symptom management: increase fluid intake, rest, tylenol or motrin for discomfort, humidifier,  use honey and lemon with tea, and gargle with saline. Advised using OTC chloraseptic/Cepacol lozenges.  -     amoxicillin (AMOXIL) 500 MG Cap; Take 1 Cap by mouth 2 times a day.        Return in about 6 weeks (around 3/6/2019).        Joaquina PARKINSON

## 2019-03-06 ENCOUNTER — TELEPHONE (OUTPATIENT)
Dept: HEMATOLOGY ONCOLOGY | Facility: MEDICAL CENTER | Age: 29
End: 2019-03-06

## 2019-03-07 ENCOUNTER — TELEPHONE (OUTPATIENT)
Dept: HEMATOLOGY ONCOLOGY | Facility: MEDICAL CENTER | Age: 29
End: 2019-03-07

## 2019-03-07 DIAGNOSIS — D24.1 FIBROADENOMA OF RIGHT BREAST: ICD-10-CM

## 2019-03-07 NOTE — TELEPHONE ENCOUNTER
Patient status post breast biopsy showing fibroadenoma.  Recommendation for repeat ultrasound approximately 3 months following procedure.  Patient is due approximately March 27 for her repeat ultrasound.  I discussed this with her today via phone and she verbalized understanding.  She also mentioned that she did receive a message from the breast center and is aware.  She did state that she will contact the breast center to schedule the appointment and I have placed the order.  I will contact patient with results via phone once it is completed.  I also discussed with the breast center alerting them that she should be contacting them soon to schedule that appointment for the repeat ultrasound.

## 2019-04-29 ENCOUNTER — HOSPITAL ENCOUNTER (OUTPATIENT)
Dept: RADIOLOGY | Facility: MEDICAL CENTER | Age: 29
End: 2019-04-29
Attending: NURSE PRACTITIONER
Payer: COMMERCIAL

## 2019-04-29 DIAGNOSIS — D24.1 FIBROADENOMA OF RIGHT BREAST: ICD-10-CM

## 2019-04-29 PROCEDURE — 76642 ULTRASOUND BREAST LIMITED: CPT | Mod: RT

## 2019-05-01 ENCOUNTER — TELEPHONE (OUTPATIENT)
Dept: HEMATOLOGY ONCOLOGY | Facility: MEDICAL CENTER | Age: 29
End: 2019-05-01

## 2019-05-28 ENCOUNTER — OFFICE VISIT (OUTPATIENT)
Dept: MEDICAL GROUP | Facility: MEDICAL CENTER | Age: 29
End: 2019-05-28
Payer: COMMERCIAL

## 2019-05-28 VITALS
WEIGHT: 144 LBS | HEIGHT: 62 IN | BODY MASS INDEX: 26.5 KG/M2 | DIASTOLIC BLOOD PRESSURE: 70 MMHG | TEMPERATURE: 99.1 F | SYSTOLIC BLOOD PRESSURE: 120 MMHG | HEART RATE: 103 BPM | OXYGEN SATURATION: 97 % | RESPIRATION RATE: 16 BRPM

## 2019-05-28 DIAGNOSIS — Q79.60 EDS (EHLERS-DANLOS SYNDROME): Chronic | ICD-10-CM

## 2019-05-28 DIAGNOSIS — F32.9 REACTIVE DEPRESSION: ICD-10-CM

## 2019-05-28 DIAGNOSIS — F41.1 GAD (GENERALIZED ANXIETY DISORDER): ICD-10-CM

## 2019-05-28 DIAGNOSIS — K58.0 IRRITABLE BOWEL SYNDROME WITH DIARRHEA: ICD-10-CM

## 2019-05-28 PROCEDURE — 99214 OFFICE O/P EST MOD 30 MIN: CPT | Performed by: NURSE PRACTITIONER

## 2019-05-28 RX ORDER — ALPRAZOLAM 0.25 MG/1
0.25 TABLET ORAL NIGHTLY PRN
Qty: 30 TAB | Refills: 2 | Status: SHIPPED | OUTPATIENT
Start: 2019-05-28 | End: 2020-01-06 | Stop reason: SDUPTHER

## 2019-05-28 NOTE — PROGRESS NOTES
cc:  IBS diarrhea/STEVE/EDS      Subjective:     HPI:     Liane Martinez is a 28 y.o. female here to discuss the evaluation and management of:    Irritable bowel syndrome, unspecified type  Patient states that she has a history of having irritable bowel syndrome with primarily diarrhea.  Patient states that approximately 11 years ago it was happening approximately 4 times a week.  States that she found out that it was pork contributing to her symptoms.  States that she had cut out pork and she would may be have a episode of diarrhea once a month.  She is now starting to have the urea that are happening 1-2 times a week.  States that after about 15 minutes after eating something she will have a wave of feeling clammy, pale and fatigued just before she has to urgently have a bowel movement.  States that she has been taking some Imodium which can be helpful.  This is frustrating for her as she is unable to find out her trigger at this time.  Denies any continued pain or cramping in her abdomen, mucus or blood in her stool.  Denies any fevers or chills.  States that sometimes she will have some abdominal cramping during her episodes but they are not persistent.  Requesting food allergy testing.     STEVE (generalized anxiety disorder)  Reactive depression  Has stopped taking fluoxetine. Feeling good. Has a new boyfriend now. Not feeling depression or anxious.  Has used her Xanax sparingly.  Needs an updated refill.    EDS (Nitesh-Danlos syndrome)  States she is now not tolerating the Tramadol after one year.  States that she has had multiple episodes of vomiting after taking the tramadol.  States that this can happen to her after using a medication for prolonged period.   Cant get into pain management for two months. Takes 1-2 per day of Flexeril.  At this time she cannot tolerate Norco that, Percocet.    ROS:  Denies any Headache, Blurred Vision, Confusion, Chest pain,  Shortness of breath,  Abdominal pain, Changes  "of bowel or bladder, Lower ext edema, Fevers, Nights sweats, Weight Changes, Focal weakness or numbness.  And all other systems are negative.        Current Outpatient Prescriptions:   •  ALPRAZolam (XANAX) 0.25 MG Tab, Take 1 Tab by mouth at bedtime as needed for Sleep or Anxiety for up to 30 days., Disp: 30 Tab, Rfl: 2  •  etonogestrel (NEXPLANON) 68 MG Implant implant, Inject 1 Each as instructed Once., Disp: , Rfl:   •  cyclobenzaprine (FLEXERIL) 5 MG tablet, Take 5-10 mg by mouth 3 times a day as needed., Disp: , Rfl:   •  tramadol (ULTRAM) 50 MG Tab, Take 50 mg by mouth every four hours as needed., Disp: , Rfl:     Allergies   Allergen Reactions   • Percocet [Oxycodone-Acetaminophen]    • Tramadol    • Tylenol Nausea   • Vicodin [Hydrocodone-Acetaminophen] Vomiting       Past Medical History:   Diagnosis Date   • Breast lump     Fibroadenoma in right breast 9-2018   • Nitesh-Danlos syndrome    • Muscle disorder     \"hyperflexibility\" of joints      Past Surgical History:   Procedure Laterality Date   • DENTAL EXTRACTION(S)      wisdom   • SEPTOPLASTY      Deviated septum repair by Dr. Jaron Guillermo     Family History   Problem Relation Age of Onset   • Cancer Mother         skin   • Hypertension Mother    • Cancer Father         skin   • Cancer Paternal Grandmother         Ovarian     Social History     Social History   • Marital status: Single     Spouse name: N/A   • Number of children: 0   • Years of education: N/A     Occupational History   • Not on file.     Social History Main Topics   • Smoking status: Never Smoker   • Smokeless tobacco: Never Used   • Alcohol use Yes      Comment: occ   • Drug use: Yes     Types: Marijuana      Comment: for chronic pain    • Sexual activity: Yes     Partners: Male     Other Topics Concern   • Not on file     Social History Narrative   • No narrative on file       Objective:     Vitals: /70   Pulse (!) 103   Temp 37.3 °C (99.1 °F)   Resp 16   Ht 1.575 m (5' 2\")   " Wt 65.3 kg (144 lb)   SpO2 97%   BMI 26.34 kg/m²    General: Alert, pleasant, NAD  HEENT: Normocephalic.    Skin: Warm, dry, no rashes.  Extremities: No leg edema. No discoloration  Neurological: No tremors  Psych:  Affect/mood is normal, judgement is good, memory is intact, grooming is appropriate.    Assessment/Plan:     Liane was seen today for anxiety.    Diagnoses and all orders for this visit:    Irritable bowel syndrome with diarrhea  Not controlled at this time.  Has been increasing.  Occurring 1-2 times a week at this time.  Have encouraged her to keep a food diary.  She does not really consume any milk products at this time as she does consume more almond milk.  I have discussed possibility of having a wheat allergy as it is also common.  Food allergy testing ordered per request.  Have discussed referral to GI.    -ALLERGEN, FOOD INCLUSIVE PANEL    EDS (Nitesh-Danlos syndrome)  I have encouraged her to follow-up with pain management as she does appear to have a intolerance to some of the common pain medications.  She states she will continue to take her Flexeril as needed for this and give her pain medications and call us if she cannot get an earlier appointment with pain management.    STEVE (generalized anxiety disorder)  Controlled at this time.  Is not having episodes of panic and anxiety.  Has stopped the fluoxetine.  Would like to keep her Xanax as needed.  Still has pills left over from her prescription back in February.  She does use this sparingly and appropriately for anxiety episodes.  Have written a prescription for a refill. Narx check completed-last filled February 22, 2019.  -     ALPRAZolam (XANAX) 0.25 MG Tab; Take 1 Tab by mouth at bedtime as needed for Sleep or Anxiety for up to 30 days.    Reactive depression  Stable at this time.  Denies any feelings of depression, hopelessness.  States that she does have a new boyfriend in her life at this time and her social life appears to be  stable.  Does not want to continue taking the fluoxetine.      Return in about 3 months (around 8/28/2019).        Joaquina SURESH.

## 2019-07-09 NOTE — PROGRESS NOTES
"CC: establish care      Liane Martinez is a 28 y.o. female here to establish care and to discuss the evaluation and management of:    EDS (Nitesh-Danlos syndrome)  Was diagnosed about 4 years ago from a rheumatologist in [place].  States she scored a 8 out of 10 of symptoms.  Patient states that she is followed by pain management since January of this year.  States she takes tramadol when she is having severe pain and she takes Flexeril to help dull down her chronic pain.  States her chronic pain is around a 4 or a  5.  States that the left side of her body hurts more than right side of her body.  Patient states that she does have a high pain tolerance.  States that the left neck and shoulder area are more painful.  States she has shin splints that cause her pain.  States her pain is more muscular.  She also states that she has a \"fallen rib. \"    Anxiety  Patient states that she does have anxiety.  States that it makes her anxious when she starts thinking about her age and her medical condition.  Does not follow-up with a counselor at this time.  States that sometimes she uses marijuana to help her calm down.      Lump in breast on the right breast:  Patient states that she recently had a breast biopsy on her right breast that was benign.  She will follow-up with oncology in 6 months for repeat imaging. Consulting physician was Dr. Chelly Cox.      ROS:  Denies any Headache, Blurred Vision, Confusion, Chest pain,  Shortness of breath,  Abdominal pain, Changes of bowel or bladder, Hematuria, Hematochezia, Lower ext. edema, Fevers, Nights sweats, Weight Changes, Focal weakness or numbness.  And all other systems are negative. Breast inc-healing/bruising      Current Outpatient Prescriptions:   •  etonogestrel (NEXPLANON) 68 MG Implant implant, Inject 1 Each as instructed Once., Disp: , Rfl:   •  cyclobenzaprine (FLEXERIL) 5 MG tablet, Take 5-10 mg by mouth 3 times a day as needed., Disp: , Rfl:   •  tramadol " "(ULTRAM) 50 MG Tab, Take 50 mg by mouth every four hours as needed., Disp: , Rfl:     Allergies   Allergen Reactions   • Percocet [Oxycodone-Acetaminophen]    • Tylenol Nausea   • Vicodin [Hydrocodone-Acetaminophen] Vomiting       Past Medical History:   Diagnosis Date   • Breast lump     Fibroadenoma in right breast 9-2018   • Nitesh-Danlos syndrome    • Muscle disorder     \"hyperflexibility\" of joints      Past Surgical History:   Procedure Laterality Date   • DENTAL EXTRACTION(S)      wisdom   • SEPTOPLASTY      Deviated septum repair by Dr. Jaron Guillermo     Family History   Problem Relation Age of Onset   • Cancer Mother         skin   • Hypertension Mother    • Cancer Father         skin   • Cancer Paternal Grandmother         Ovarian     Social History     Social History   • Marital status: Single     Spouse name: N/A   • Number of children: 0   • Years of education: N/A     Occupational History   • Not on file.     Social History Main Topics   • Smoking status: Never Smoker   • Smokeless tobacco: Never Used   • Alcohol use Yes      Comment: occ   • Drug use: Yes     Types: Marijuana      Comment: for chronic pain    • Sexual activity: Yes     Partners: Male     Other Topics Concern   • Not on file     Social History Narrative   • No narrative on file       Objective:     Vitals: /66 (BP Location: Right arm, Patient Position: Sitting)   Pulse 84   Temp 36.6 °C (97.8 °F) (Temporal)   Resp 16   Ht 1.594 m (5' 2.75\")   Wt 53.1 kg (117 lb)   LMP 07/05/2018   SpO2 97%   BMI 20.89 kg/m²      General: Alert, pleasant, NAD  HEENT:  Normocephalic.   Heart:  Regular rate and rhythm.  S1 and S2 normal.  No murmurs appreciated.    Breast: small 1mm sized laceration on right breast with bruising  Respiratory:  Normal respiratory effort.  Clear to auscultation bilaterally.    Skin:  Warm, dry, no rashes  Musculoskeletal:  Gait is normal.  Moves all extremities well.  Extremities:  No leg edema.  Neurological: No " tremors, sensation grossly intact  Psych:  Affect/mood is normal, judgement is good, memory is intact, grooming is appropriate.      Assessment and Plan.   28 y.o. Female to establish care and discuss the following    1. EDS (Nitesh-Danlos syndrome)  Chronic. Followed by pain management. Takes Flexeril and Tramadol.     2. STEVE (generalized anxiety disorder)  Somewhat controlled. occasional fleeting worry about her medical condition. Will notify if she would like counseling or not.    3.Fibroadenoma of right breast  Benign. Will follow up with oncology in 6 months for follow up imaging.     4.Nexplanon in place        Return if symptoms worsen or fail to improve.      Joaquina SURESH.   home

## 2019-07-29 ENCOUNTER — HOSPITAL ENCOUNTER (OUTPATIENT)
Dept: RADIOLOGY | Facility: MEDICAL CENTER | Age: 29
End: 2019-07-29
Attending: PHYSICIAN ASSISTANT
Payer: COMMERCIAL

## 2019-07-29 DIAGNOSIS — M54.2 NECK PAIN: ICD-10-CM

## 2019-07-29 DIAGNOSIS — R07.9 CHEST PAIN, UNSPECIFIED TYPE: ICD-10-CM

## 2019-07-29 PROCEDURE — 71046 X-RAY EXAM CHEST 2 VIEWS: CPT

## 2019-07-29 PROCEDURE — 72040 X-RAY EXAM NECK SPINE 2-3 VW: CPT

## 2020-01-06 ENCOUNTER — HOSPITAL ENCOUNTER (OUTPATIENT)
Facility: MEDICAL CENTER | Age: 30
End: 2020-01-06
Attending: NURSE PRACTITIONER
Payer: COMMERCIAL

## 2020-01-06 ENCOUNTER — OFFICE VISIT (OUTPATIENT)
Dept: MEDICAL GROUP | Facility: MEDICAL CENTER | Age: 30
End: 2020-01-06
Payer: COMMERCIAL

## 2020-01-06 VITALS
DIASTOLIC BLOOD PRESSURE: 70 MMHG | TEMPERATURE: 99 F | HEART RATE: 82 BPM | BODY MASS INDEX: 26.13 KG/M2 | RESPIRATION RATE: 16 BRPM | HEIGHT: 62 IN | OXYGEN SATURATION: 96 % | WEIGHT: 142 LBS | SYSTOLIC BLOOD PRESSURE: 108 MMHG

## 2020-01-06 DIAGNOSIS — Z12.4 PAP SMEAR FOR CERVICAL CANCER SCREENING: ICD-10-CM

## 2020-01-06 DIAGNOSIS — Z71.84 TRAVEL ADVICE ENCOUNTER: ICD-10-CM

## 2020-01-06 DIAGNOSIS — Z23 NEED FOR VACCINATION: ICD-10-CM

## 2020-01-06 DIAGNOSIS — F41.1 GAD (GENERALIZED ANXIETY DISORDER): ICD-10-CM

## 2020-01-06 LAB
FORWARD REASON: SPWHY: NORMAL
FORWARDED TO LAB: SPWHR: NORMAL
SPECIMEN SENT: SPWT1: NORMAL

## 2020-01-06 PROCEDURE — 99395 PREV VISIT EST AGE 18-39: CPT | Mod: 25 | Performed by: NURSE PRACTITIONER

## 2020-01-06 PROCEDURE — 90632 HEPA VACCINE ADULT IM: CPT | Performed by: NURSE PRACTITIONER

## 2020-01-06 PROCEDURE — 90471 IMMUNIZATION ADMIN: CPT | Performed by: NURSE PRACTITIONER

## 2020-01-06 RX ORDER — ALPRAZOLAM 0.25 MG/1
0.25 TABLET ORAL NIGHTLY PRN
Qty: 30 TAB | Refills: 2 | Status: SHIPPED | OUTPATIENT
Start: 2020-01-06 | End: 2020-02-05

## 2020-01-06 RX ORDER — AMITRIPTYLINE HYDROCHLORIDE 50 MG/1
TABLET, FILM COATED ORAL
COMMUNITY
Start: 2019-11-25 | End: 2023-06-12

## 2020-01-06 RX ORDER — HYDROCODONE BITARTRATE AND ACETAMINOPHEN 5; 325 MG/1; MG/1
1-2 TABLET ORAL EVERY 4 HOURS PRN
COMMUNITY

## 2020-01-06 ASSESSMENT — PATIENT HEALTH QUESTIONNAIRE - PHQ9
5. POOR APPETITE OR OVEREATING: NOT AT ALL
2. FEELING DOWN, DEPRESSED, IRRITABLE, OR HOPELESS: NOT AT ALL
9. THOUGHTS THAT YOU WOULD BE BETTER OFF DEAD, OR OF HURTING YOURSELF: NOT AT ALL
6. FEELING BAD ABOUT YOURSELF - OR THAT YOU ARE A FAILURE OR HAVE LET YOURSELF OR YOUR FAMILY DOWN: NOT AL ALL
1. LITTLE INTEREST OR PLEASURE IN DOING THINGS: NOT AT ALL
7. TROUBLE CONCENTRATING ON THINGS, SUCH AS READING THE NEWSPAPER OR WATCHING TELEVISION: NOT AT ALL
SUM OF ALL RESPONSES TO PHQ9 QUESTIONS 1 AND 2: 0
SUM OF ALL RESPONSES TO PHQ QUESTIONS 1-9: 0
3. TROUBLE FALLING OR STAYING ASLEEP OR SLEEPING TOO MUCH: NOT AT ALL
4. FEELING TIRED OR HAVING LITTLE ENERGY: NOT AT ALL
8. MOVING OR SPEAKING SO SLOWLY THAT OTHER PEOPLE COULD HAVE NOTICED. OR THE OPPOSITE, BEING SO FIGETY OR RESTLESS THAT YOU HAVE BEEN MOVING AROUND A LOT MORE THAN USUAL: NOT AT ALL

## 2020-01-06 NOTE — PROGRESS NOTES
"CC:  Pap/Well Woman Exam    History of present illness:    Liane Martinez is 29 y.o. female presenting today for well woman exam with gynecological exam and Pap smear.   She reports her periods are absent.  She is currently using  Nexplanon as birth control. Having periods She is currently not exercising on a routine basis. No history of pregnancy.     Last mammogram:n/a  Last Dexa scan:n/a  Last colonoscopy: n/a      2. Travel advice encounter  Will be going to [place] in April. Needs vaccines.     3. STEVE (generalized anxiety disorder)  Needs refill on her xanax. Last fill was in July-her refills have .          Past Medical History:   Diagnosis Date   • Breast lump     Fibroadenoma in right breast    • Nitesh-Danlos syndrome    • Muscle disorder     \"hyperflexibility\" of joints        Past Surgical History:   Procedure Laterality Date   • DENTAL EXTRACTION(S)      wisdom   • SEPTOPLASTY      Deviated septum repair by Dr. Jaron Guillermo       Outpatient Encounter Medications as of 2020   Medication Sig Dispense Refill   • HYDROcodone-acetaminophen (NORCO) 5-325 MG Tab per tablet Take 1-2 Tabs by mouth every four hours as needed.     • ALPRAZolam (XANAX) 0.25 MG Tab Take 1 Tab by mouth at bedtime as needed for Sleep or Anxiety for up to 30 days. 30 Tab 2   • etonogestrel (NEXPLANON) 68 MG Implant implant Inject 1 Each as instructed Once.     • cyclobenzaprine (FLEXERIL) 5 MG tablet Take 5-10 mg by mouth 3 times a day as needed.     • amitriptyline (ELAVIL) 50 MG Tab      • tramadol (ULTRAM) 50 MG Tab Take 50 mg by mouth every four hours as needed.       No facility-administered encounter medications on file as of 2020.        Patient Active Problem List    Diagnosis Date Noted   • Reactive depression 2019   • STEVE (generalized anxiety disorder) 10/05/2018   • EDS (Nitesh-Danlos syndrome) 10/05/2018   • Nexplanon in place 10/05/2018   • Fibroadenoma of right breast 10/05/2018       .  Social " "History     Socioeconomic History   • Marital status: Single     Spouse name: Not on file   • Number of children: 0   • Years of education: Not on file   • Highest education level: Not on file   Occupational History   • Not on file   Social Needs   • Financial resource strain: Not on file   • Food insecurity:     Worry: Not on file     Inability: Not on file   • Transportation needs:     Medical: Not on file     Non-medical: Not on file   Tobacco Use   • Smoking status: Never Smoker   • Smokeless tobacco: Never Used   Substance and Sexual Activity   • Alcohol use: Yes     Comment: occ   • Drug use: Yes     Types: Marijuana     Comment: for chronic pain    • Sexual activity: Yes     Partners: Male   Lifestyle   • Physical activity:     Days per week: Not on file     Minutes per session: Not on file   • Stress: Not on file   Relationships   • Social connections:     Talks on phone: Not on file     Gets together: Not on file     Attends Advent service: Not on file     Active member of club or organization: Not on file     Attends meetings of clubs or organizations: Not on file     Relationship status: Not on file   • Intimate partner violence:     Fear of current or ex partner: Not on file     Emotionally abused: Not on file     Physically abused: Not on file     Forced sexual activity: Not on file   Other Topics Concern   • Not on file   Social History Narrative   • Not on file       Family History   Problem Relation Age of Onset   • Cancer Mother         skin   • Hypertension Mother    • Cancer Father         skin   • Cancer Paternal Grandmother         Ovarian         ROS: Denies Weight loss, fatigue, chest pain, SOB, bowel or bladder changes.  Denies musculoskeletal, neurological, or psychiatric problems.  Denies dysuria, dyspareunia or abnormal vaginal discharge.  States in a safe relationship no      /70   Pulse 82   Temp 37.2 °C (99 °F)   Resp 16   Ht 1.575 m (5' 2\")   Wt 64.4 kg (142 lb)   SpO2 " 96%   BMI 25.97 kg/m²     GEN:  Appears well and in no apparent distress   NECK:  Supple without adenopathy or thyromegaly  LUNGS:  Clear to auscultation.  Normal breath sounds.  CV:  RRR without murmers or S3 or S4.  Peripheral pulses intact.  BREAST: Right breast with fibroglandular tissue present inferior to areola.  Symmetrical without axillary adenopathy.  ABD:  Soft, non-tender, non-distended, normal bowel sounds.  No hepatosplenomegaly.  :  Normal external female genitalia.  Small introitus.  Slight brown/old blood discharge, thick.  IUD strings present.  Vaginal canal clear.  Cervix appears normal.  Bimanual exam:  No CMT, normal size uterus without masses or tenderness; no adnexal masses or tenderness.      Assessment and plan    1. Pap smear for cervical cancer screening  Will message patient with results.  -THINPREP PAP WITH HPV    2. Travel advice encounter  She has received her first dose of the hep A vaccine today in the clinic.  Recommend follow-up in 6 months for the second dose.  Recommend for her to follow-up with the travel clinic for her further immunizations I recommended by the CDC for her specific area of travel.  Patient states that she will do so.  - Hep A Adult 19+    3. STEVE (generalized anxiety disorder)  Narx check completed.  Have refilled her prescription for Xanax.  She does use this very sparingly as she still has 5 tablets left from her original prescription in July.  - ALPRAZolam (XANAX) 0.25 MG Tab; Take 1 Tab by mouth at bedtime as needed for Sleep or Anxiety for up to 30 days.  Dispense: 30 Tab; Refill: 2    4. Need for vaccination  - Hep A Adult 19+      Have encouraged the patient to have a heart healthy diet, rich in fruits and vegetables. Limit alcohol use, avoid tobacco products. Participate in physical activity at least 3-5 times weekly. Take a multivitamin daily. Continue with recommended screenings and immunizations.     A chaperone was offered to the patient during  today's exam. Chaperone name: Kelin was present.    I have placed the below orders and discussed them with an approved delegating provider.  The MA is performing the below orders under the direction of Dr. Emmanuel.      Pap Smear  Specimen collected today will await results from the lab.

## 2020-02-11 ENCOUNTER — OFFICE VISIT (OUTPATIENT)
Dept: MEDICAL GROUP | Facility: MEDICAL CENTER | Age: 30
End: 2020-02-11
Payer: COMMERCIAL

## 2020-02-11 VITALS
SYSTOLIC BLOOD PRESSURE: 128 MMHG | WEIGHT: 142 LBS | HEIGHT: 62 IN | RESPIRATION RATE: 16 BRPM | HEART RATE: 83 BPM | BODY MASS INDEX: 26.13 KG/M2 | TEMPERATURE: 99.1 F | DIASTOLIC BLOOD PRESSURE: 80 MMHG | OXYGEN SATURATION: 97 %

## 2020-02-11 DIAGNOSIS — J34.2 DEVIATED SEPTUM: ICD-10-CM

## 2020-02-11 DIAGNOSIS — S09.92XA INJURY OF NOSE, INITIAL ENCOUNTER: ICD-10-CM

## 2020-02-11 PROCEDURE — 99213 OFFICE O/P EST LOW 20 MIN: CPT | Performed by: NURSE PRACTITIONER

## 2020-02-11 NOTE — PROGRESS NOTES
"cc:  Trauma to nose      Subjective:     HPI:     Liane Martinez is a 29 y.o. female here to discuss the evaluation and management of:    Patient is here today she reports that last Saturday night she opened the car door on her nose.  States that her nose immediately started to bleed and have swelling.  She did apply ice for approximately 6 hours.  She states is been difficult for her to sleep and have air flow through her nasal passages left greater than right.  Currently she not have any bloody noses at this time.  States that she does have a history having a broken nose about 16 years ago with a reset.  Would like referral to an ENT.    ROS:  Denies any Headache, Blurred Vision, Confusion, Chest pain,  Shortness of breath,  Abdominal pain, Changes of bowel or bladder, Lower ext edema, Fevers, Nights sweats, Weight Changes, Focal weakness or numbness.  And all other systems reviewed and are all negative.        Current Outpatient Medications:   •  HYDROcodone-acetaminophen (NORCO) 5-325 MG Tab per tablet, Take 1-2 Tabs by mouth every four hours as needed., Disp: , Rfl:   •  amitriptyline (ELAVIL) 50 MG Tab, , Disp: , Rfl:   •  etonogestrel (NEXPLANON) 68 MG Implant implant, Inject 1 Each as instructed Once., Disp: , Rfl:   •  cyclobenzaprine (FLEXERIL) 5 MG tablet, Take 5-10 mg by mouth 3 times a day as needed., Disp: , Rfl:   •  tramadol (ULTRAM) 50 MG Tab, Take 50 mg by mouth every four hours as needed., Disp: , Rfl:     Allergies   Allergen Reactions   • Percocet [Oxycodone-Acetaminophen]    • Tramadol    • Tylenol Nausea   • Vicodin [Hydrocodone-Acetaminophen] Vomiting       Past Medical History:   Diagnosis Date   • Breast lump     Fibroadenoma in right breast 9-2018   • Nitesh-Danlos syndrome    • Muscle disorder     \"hyperflexibility\" of joints      Past Surgical History:   Procedure Laterality Date   • DENTAL EXTRACTION(S)      wisdom   • SEPTOPLASTY      Deviated septum repair by Dr. Jaron Guillermo " "    Family History   Problem Relation Age of Onset   • Cancer Mother         skin   • Hypertension Mother    • Cancer Father         skin   • Cancer Paternal Grandmother         Ovarian     Social History     Socioeconomic History   • Marital status: Single     Spouse name: Not on file   • Number of children: 0   • Years of education: Not on file   • Highest education level: Not on file   Occupational History   • Not on file   Social Needs   • Financial resource strain: Not on file   • Food insecurity:     Worry: Not on file     Inability: Not on file   • Transportation needs:     Medical: Not on file     Non-medical: Not on file   Tobacco Use   • Smoking status: Never Smoker   • Smokeless tobacco: Never Used   Substance and Sexual Activity   • Alcohol use: Yes     Comment: occ   • Drug use: Yes     Types: Marijuana     Comment: for chronic pain    • Sexual activity: Yes     Partners: Male   Lifestyle   • Physical activity:     Days per week: Not on file     Minutes per session: Not on file   • Stress: Not on file   Relationships   • Social connections:     Talks on phone: Not on file     Gets together: Not on file     Attends Yazdanism service: Not on file     Active member of club or organization: Not on file     Attends meetings of clubs or organizations: Not on file     Relationship status: Not on file   • Intimate partner violence:     Fear of current or ex partner: Not on file     Emotionally abused: Not on file     Physically abused: Not on file     Forced sexual activity: Not on file   Other Topics Concern   • Not on file   Social History Narrative   • Not on file       Objective:     Vitals: /80   Pulse 83   Temp 37.3 °C (99.1 °F)   Resp 16   Ht 1.575 m (5' 2\")   Wt 64.4 kg (142 lb)   SpO2 97%   BMI 25.97 kg/m²    General: Alert, pleasant, NAD  HEENT: Normocephalic.  Bruising present on nose, no edema, slight malposition of septum.  No visible obstruction of her nasal passages.  Skin: Warm, " dry, no rashes.  Extremities: No leg edema. No discoloration  Neurological: No tremors  Psych:  Affect/mood is normal, judgement is good, memory is intact, grooming is appropriate.    Assessment/Plan:     Liane was seen today for facial injury.    Diagnoses and all orders for this visit:    Injury of nose, initial encounter  Deviated septum  Request expertise of ENT for possible reset of her nose.  Airway is not obstructed at this time.  Emergent precautions discussed.  -     REFERRAL TO ENT        Return if symptoms worsen or fail to improve.        Joaquina PARKINSON

## 2021-01-25 ENCOUNTER — TELEMEDICINE (OUTPATIENT)
Dept: MEDICAL GROUP | Facility: MEDICAL CENTER | Age: 31
End: 2021-01-25
Payer: COMMERCIAL

## 2021-01-25 VITALS — TEMPERATURE: 97.3 F | BODY MASS INDEX: 25.76 KG/M2 | HEIGHT: 62 IN | WEIGHT: 140 LBS

## 2021-01-25 DIAGNOSIS — N92.6 IRREGULAR MENSTRUAL CYCLE: ICD-10-CM

## 2021-01-25 DIAGNOSIS — Z97.5 NEXPLANON IN PLACE: ICD-10-CM

## 2021-01-25 PROBLEM — F41.1 GENERALIZED ANXIETY DISORDER: Chronic | Status: ACTIVE | Noted: 2018-10-05

## 2021-01-25 PROCEDURE — 99213 OFFICE O/P EST LOW 20 MIN: CPT | Mod: 95,CR | Performed by: NURSE PRACTITIONER

## 2021-01-25 NOTE — LETTER
January 26, 2021        Liane Martinez  [address] [facility] [place] [address]      In my medical opinion Ms. Liane Martinez would greatly benefit from having the COVID-19 vaccination as early as possible as she does have an underlying medical condition that places her at a higher risk of serious illness if she were to contract COVID-19.        Sincerely,        KERWIN Mccoy.    Electronically Signed

## 2021-01-25 NOTE — LETTER
[facility]  KACY MccoyP.R.N.  [address] [facility] [address]  [facility] [address]  Fax: [phone]   Authorization for Release/Disclosure of   Protected Health Information   Name: DEDRA CRABTREE : 1990 SSN: [id number]   Address: [address] [facility] [address] Phone:    [phone] (home)    I authorize the entity listed below to release/disclose the PHI below to:   [facility]/Joaquina Higgins A.P.R.NJoshua and KACY MccoyP.R.DAYANA.   Provider or Entity Name:                                               Dr. Lori Diaz - [facility], [facility]   Phone: [phone]      Fax: [phone]       Reason for request: continuity of care   Information to be released:    [  ] LAST COLONOSCOPY,  including any PATH REPORT and follow-up  [  ] LAST FIT/COLOGUARD RESULT [  ] LAST DEXA  [  ] LAST MAMMOGRAM  [  ] LAST PAP  [  ] LAST LABS [  ] RETINA EXAM REPORT  [  ] IMMUNIZATION RECORDS  [X] Release all info      [  ] Check here and initial the line next to each item to release ALL health information INCLUDING  _____ Care and treatment for drug and / or alcohol abuse  _____ HIV testing, infection status, or AIDS  _____ Genetic Testing    DATES OF SERVICE OR TIME PERIOD TO BE DISCLOSED: _____________  I understand and acknowledge that:  * This Authorization may be revoked at any time by you in writing, except if your health information has already been used or disclosed.  * Your health information that will be used or disclosed as a result of you signing this authorization could be re-disclosed by the recipient. If this occurs, your re-disclosed health information may no longer be protected by State or Federal laws.  * You may refuse to sign this Authorization. Your refusal will not affect your ability to obtain treatment.  * This Authorization becomes effective upon signing and will  on (date) __________.      If no date is indicated, this Authorization will   one (1) year from the signature date.    Name: Liane Soriano Chaffee    Continuity of Care    Date:     2021       PLEASE FAX REQUESTED RECORDS BACK TO: [phone]

## 2021-04-01 ENCOUNTER — HOSPITAL ENCOUNTER (OUTPATIENT)
Dept: RADIOLOGY | Facility: MEDICAL CENTER | Age: 31
End: 2021-04-01
Attending: PHYSICIAN ASSISTANT
Payer: COMMERCIAL

## 2021-04-01 DIAGNOSIS — M25.512 LEFT SHOULDER PAIN, UNSPECIFIED CHRONICITY: ICD-10-CM

## 2021-04-01 DIAGNOSIS — M25.572 LEFT ANKLE PAIN, UNSPECIFIED CHRONICITY: ICD-10-CM

## 2021-04-01 PROCEDURE — 73030 X-RAY EXAM OF SHOULDER: CPT | Mod: LT

## 2021-04-01 PROCEDURE — 73610 X-RAY EXAM OF ANKLE: CPT | Mod: LT

## 2021-06-15 DIAGNOSIS — Z97.5 NEXPLANON IN PLACE: ICD-10-CM

## 2021-09-10 ENCOUNTER — GYNECOLOGY VISIT (OUTPATIENT)
Dept: OBGYN | Facility: CLINIC | Age: 31
End: 2021-09-10
Payer: COMMERCIAL

## 2021-09-10 VITALS — BODY MASS INDEX: 25.79 KG/M2 | SYSTOLIC BLOOD PRESSURE: 118 MMHG | DIASTOLIC BLOOD PRESSURE: 72 MMHG | WEIGHT: 141 LBS

## 2021-09-10 DIAGNOSIS — Z30.46 ENCOUNTER FOR REMOVAL AND REINSERTION OF NEXPLANON: Primary | ICD-10-CM

## 2021-09-10 LAB
INT CON NEG: NORMAL
INT CON POS: NORMAL
POC URINE PREGNANCY TEST: NEGATIVE

## 2021-09-10 PROCEDURE — 81025 URINE PREGNANCY TEST: CPT | Performed by: OBSTETRICS & GYNECOLOGY

## 2021-09-10 PROCEDURE — 11983 REMOVE/INSERT DRUG IMPLANT: CPT | Performed by: OBSTETRICS & GYNECOLOGY

## 2021-09-10 NOTE — NON-PROVIDER
Pt here for new pt appt  Pt states she is here for a nexplanon removal and reinsertion  Pharmacy verified  Good #:[phone] (home)

## 2021-09-10 NOTE — PROGRESS NOTES
Chief complaint: Nexplanon removal with reinsertion                                  Nexplanon Removal :  Patient given informed consent, and is aware that removal may take longer, require more anesthesia and time.  It also can make a scar slightly, but not substantially larger, than that used for insertion .     After prepped and draping of left  upper inner forearm, implant visually identified and site of insertion marked.   Lidocaine 1% with epi infiltrated both distal and deep to the prior insertion site for anesthesia.   Utilizing a scalpel # 15, a small vertical incision was made near scar of prior insertion site in the site of maximal implant visualization.   Implant identified and capsule was incised with scalpel.  Implant grasped with mosquito forceps and was easily removed, examined and found to be intact/complete.                                  Procedure Note : Nexplanon Insertion :     Today I discussed with the patient subdermal implant, Nexplanon.  We discussed its mechanism of action and how it prevents pregnancy  Discussed that the implant is a progesterone only form of contraception.  Also discussed with patient risks associated with placement of the device which can include infection bruising and pain. We also discussed the possibility of the device can be displaced. We discussed that the device would be palpable under the skin of the arm.  I also discussed with the patient side effects of the device which can include but are not limited to, irregular bleeding which can include amenorrhea, irregular spotting and also worsening of menstrual cycles. There is an increased risk of headaches related to the device and potential for weight gain.  After thorough discussion the patient had opportunity to ask questions regarding the device and at this time desires the device to be placed today.  Patient information handout has been given for the device.    After informed consent and discussion of risks and  benefits, patient who describes her dominant hand as right, was prepped and draped to expose the inner medial surface of the upper left  Insertion site marked at 8 cm distal to the medial epicondyle  1% lidocaine placed sub-Q along course of implant . Area totally anesthestized.   Nexplanon applicator placed with bevel of needle down and skin lifted and inserted to hub. Trigger fired and nexplanon released.  Implant palpated by myself and patient in proper location.   Small needle puncture hemostatic   Steri Strips placed   Arm wrapped with self sealing gauze     Patient given Postoperative Advice  Take NSAIDS and tylenol for pain, ice packs prn bruising/discomfort  Remove gauze wrap after 24 hrs, or at anytime it becomes uncomfortable   Steri Strips to be removed at 3-4 days  RTC as needed  Remove outer adhesive roll gauze after 24 hrs and steri strips after 3-4 days.

## 2021-10-07 NOTE — PROGRESS NOTES
Telemedicine Video Visit: Established Patient   This Remote Face to Face encounter was conducted via Zoom. Given the importance of social distancing and other strategies recommended to reduce the risk of COVID-19 transmission, I am providing medical care to this patient via audio/video visit in place of an in person visit at the request of the patient. Verbal consent to telehealth, risks, benefits, and consequences were discussed. Patient retains the right to withdraw at any time. All existing confidentiality protections apply. The patient has access to all transmitted medical information. No dissemination of any patient images or information to other entities without further written consent.  Subjective:     Chief Complaint   Patient presents with   • Referral Needed     To get Nexplanon removed.   • Immunizations     Covid Vaccine; would like info if to receive vacc. due to pt having EDS       Liane Martinez is a 30 y.o. female presenting for evaluation and management of:    Patient presents today as she has been having irregular menstrual cycle since November 2020.  Patient does have a Nexplanon.  States that she is due to have her Nexplanon changed out this coming June 2021.  States that she is nervous as she has a history of having severe cramping and pain with her menstrual cycles.  This is the reason she had up getting the Nexplanon.  This is her second Nexplanon that she has at this time.  She is also been noticing that she has been having some acne around her chin.  She does make mention that she did have labs in October with her chronic pain management provider.  States that her TSH was 0.54, T3 3.5, free T4 at 1.2.  Will be requesting these records from [facility].  June is when her Nexplanon is scheduled to be changed out. This is     She is very eager to receive a COVID-19 vaccine would like a letter to provide to her work to see if this can help expedite vaccination.    ROS irregular menstrual  "cycles  Denies any recent fevers or chills. No nausea or vomiting. No chest pains or shortness of breath.     Allergies   Allergen Reactions   • Percocet [Oxycodone-Acetaminophen]    • Tramadol    • Tylenol Nausea   • Vicodin [Hydrocodone-Acetaminophen] Vomiting       Current medicines (including changes today)  Current Outpatient Medications   Medication Sig Dispense Refill   • HYDROcodone-acetaminophen (NORCO) 5-325 MG Tab per tablet Take 1-2 Tabs by mouth every four hours as needed.     • amitriptyline (ELAVIL) 50 MG Tab      • etonogestrel (NEXPLANON) 68 MG Implant implant Inject 1 Each as instructed Once.     • cyclobenzaprine (FLEXERIL) 5 MG tablet Take 5-10 mg by mouth 3 times a day as needed.     • tramadol (ULTRAM) 50 MG Tab Take 50 mg by mouth every four hours as needed.       No current facility-administered medications for this visit.        Patient Active Problem List    Diagnosis Date Noted   • Reactive depression 01/23/2019   • STEVE (generalized anxiety disorder) 10/05/2018   • EDS (Nitesh-Danlos syndrome) 10/05/2018   • Nexplanon in place 10/05/2018   • Fibroadenoma of right breast 10/05/2018       Family History   Problem Relation Age of Onset   • Cancer Mother         skin   • Hypertension Mother    • Cancer Father         skin   • Cancer Paternal Grandmother         Ovarian       She  has a past medical history of Breast lump, Nitesh-Danlos syndrome, and Muscle disorder. She also has no past medical history of Allergy, Arthritis, or OSTEOPOROSIS.  She  has a past surgical history that includes dental extraction(s) and septoplasty.       Objective:   Vitals obtained by patient:  Temp 36.3 °C (97.3 °F) (Temporal)   Ht 1.575 m (5' 2\")   Wt 63.5 kg (140 lb)   BMI 25.61 kg/m²     Physical Exam:  Constitutional: Alert, no distress, well-groomed.  Skin: No rashes in visible areas.  Eye: Round. Conjunctiva clear, lids normal. No icterus.   ENMT: Lips pink without lesions, good dentition, moist mucous " membranes. Phonation normal.  Neck: No masses, no thyromegaly. Moves freely without pain.  CV: Pulse as reported by patient  Respiratory: Unlabored respiratory effort, no cough or audible wheeze  Psych: Alert and oriented x3, normal affect and mood.       Assessment and Plan:   The following treatment plan was discussed:     1. Irregular menstrual cycle  New problem since it of November.  Patient currently has a Nexplanon.  Eager to have this changed out.  Referral placed to gynecology.  Requesting labs from [facility].  Per patient report thyroid levels normal.    2. Nexplanon in place  - REFERRAL TO GYNECOLOGY        Follow-up: No follow-ups on file.    Face to Face Video Visit:     TESHA Mccoy        Bilateral Helical Rim Advancement Flap Text: The defect edges were debeveled with a #15 blade scalpel.  Given the location of the defect and the proximity to free margins (helical rim) a bilateral helical rim advancement flap was deemed most appropriate.  Using a sterile surgical marker, the appropriate advancement flaps were drawn incorporating the defect and placing the expected incisions between the helical rim and antihelix where possible.  The area thus outlined was incised through and through with a #15 scalpel blade.  With a skin hook and iris scissors, the flaps were gently and sharply undermined and freed up.

## 2022-02-22 ENCOUNTER — OFFICE VISIT (OUTPATIENT)
Dept: MEDICAL GROUP | Facility: MEDICAL CENTER | Age: 32
End: 2022-02-22
Payer: COMMERCIAL

## 2022-02-22 VITALS
SYSTOLIC BLOOD PRESSURE: 92 MMHG | HEART RATE: 99 BPM | HEIGHT: 62 IN | RESPIRATION RATE: 16 BRPM | WEIGHT: 143 LBS | OXYGEN SATURATION: 100 % | DIASTOLIC BLOOD PRESSURE: 60 MMHG | BODY MASS INDEX: 26.31 KG/M2 | TEMPERATURE: 99.5 F

## 2022-02-22 DIAGNOSIS — T50.B95A ADVERSE REACTION TO COVID-19 VACCINE: ICD-10-CM

## 2022-02-22 DIAGNOSIS — F41.1 GAD (GENERALIZED ANXIETY DISORDER): ICD-10-CM

## 2022-02-22 DIAGNOSIS — F40.243 ANXIETY WITH FLYING: ICD-10-CM

## 2022-02-22 PROCEDURE — 99213 OFFICE O/P EST LOW 20 MIN: CPT | Performed by: NURSE PRACTITIONER

## 2022-02-22 RX ORDER — CYCLOBENZAPRINE HCL 10 MG
10 TABLET ORAL EVERY 8 HOURS PRN
COMMUNITY
Start: 2021-12-22 | End: 2023-06-12

## 2022-02-22 RX ORDER — ALPRAZOLAM 0.25 MG/1
0.25 TABLET ORAL
Qty: 30 TABLET | Refills: 0 | Status: SHIPPED | OUTPATIENT
Start: 2022-02-22 | End: 2022-03-24

## 2022-02-22 ASSESSMENT — PATIENT HEALTH QUESTIONNAIRE - PHQ9
5. POOR APPETITE OR OVEREATING: NOT AT ALL
SUM OF ALL RESPONSES TO PHQ QUESTIONS 1-9: 8
3. TROUBLE FALLING OR STAYING ASLEEP OR SLEEPING TOO MUCH: MORE THAN HALF THE DAYS
9. THOUGHTS THAT YOU WOULD BE BETTER OFF DEAD, OR OF HURTING YOURSELF: NOT AT ALL
SUM OF ALL RESPONSES TO PHQ9 QUESTIONS 1 AND 2: 2
1. LITTLE INTEREST OR PLEASURE IN DOING THINGS: SEVERAL DAYS
6. FEELING BAD ABOUT YOURSELF - OR THAT YOU ARE A FAILURE OR HAVE LET YOURSELF OR YOUR FAMILY DOWN: SEVERAL DAYS
8. MOVING OR SPEAKING SO SLOWLY THAT OTHER PEOPLE COULD HAVE NOTICED. OR THE OPPOSITE, BEING SO FIGETY OR RESTLESS THAT YOU HAVE BEEN MOVING AROUND A LOT MORE THAN USUAL: NOT AT ALL
2. FEELING DOWN, DEPRESSED, IRRITABLE, OR HOPELESS: SEVERAL DAYS
7. TROUBLE CONCENTRATING ON THINGS, SUCH AS READING THE NEWSPAPER OR WATCHING TELEVISION: SEVERAL DAYS
4. FEELING TIRED OR HAVING LITTLE ENERGY: MORE THAN HALF THE DAYS

## 2022-02-23 NOTE — PROGRESS NOTES
"Chief Complaint   Patient presents with   • Medication Refill     Xanax         Subjective:     HPI:     Liane Martinez is a 31 y.o. female   here to discuss the evaluation and management of:     Traveling to [place] in March for about 16 days.   Requesting to have Xanax for traveling as this does increase her anxiety.  checked. No inconsistencies.    Had one COVID vaccine and reported having rash 20 minutes later. No anaphylaxis. Tells me diffuse raised rash from her neck down to her waist. Resolved about 48 hours later. She reportedly contact the CDC and was advised not to get any other additional vaccines at this time.Was told she was \"1 in a million to get this reaction.\"     ROS:  Denies any Headache, Blurred Vision, Confusion, Chest pain,  Shortness of breath,  Abdominal pain, Changes of bowel or bladder, Lower ext edema, Fevers, Nights sweats, Weight Changes, Focal weakness or numbness.  And all other systems reviewed and are all negative. POSITIVE FOR : see above        Current Outpatient Medications:   •  ALPRAZolam (XANAX) 0.25 MG Tab, Take 1 Tablet by mouth 1 time a day as needed for Anxiety (and when traveling) for up to 30 days., Disp: 30 Tablet, Rfl: 0  •  cyclobenzaprine (FLEXERIL) 10 mg Tab, Take 10 mg by mouth every 8 hours as needed., Disp: , Rfl:   •  HYDROcodone-acetaminophen (NORCO) 5-325 MG Tab per tablet, Take 1-2 Tabs by mouth every four hours as needed., Disp: , Rfl:   •  amitriptyline (ELAVIL) 50 MG Tab, , Disp: , Rfl:   •  etonogestrel (NEXPLANON) 68 MG Implant implant, Inject 1 Each as instructed Once., Disp: , Rfl:   •  cyclobenzaprine (FLEXERIL) 5 MG tablet, Take 5-10 mg by mouth 3 times a day as needed., Disp: , Rfl:     Allergies   Allergen Reactions   • Percocet [Oxycodone-Acetaminophen]    • Tramadol    • Tylenol Nausea   • Vicodin [Hydrocodone-Acetaminophen] Vomiting   • [place] Swelling       Past Medical History:   Diagnosis Date   • Anxiety    • Breast lump     " "Fibroadenoma in right breast 9-2018   • Depression    • Nitesh-Danlos syndrome    • Muscle disorder     \"hyperflexibility\" of joints      Past Surgical History:   Procedure Laterality Date   • DENTAL EXTRACTION(S)      wisdom   • SEPTOPLASTY      Deviated septum repair by Dr. Jaron Guillermo     Family History   Problem Relation Age of Onset   • Cancer Mother         skin   • Hypertension Mother    • Cancer Father         skin   • Cancer Paternal Grandmother         Ovarian     Social History     Socioeconomic History   • Marital status:      Spouse name: Not on file   • Number of children: 0   • Years of education: Not on file   • Highest education level: Not on file   Occupational History   • Not on file   Tobacco Use   • Smoking status: Never Smoker   • Smokeless tobacco: Never Used   Vaping Use   • Vaping Use: Never used   Substance and Sexual Activity   • Alcohol use: Yes     Comment: occ   • Drug use: Yes     Types: Marijuana     Comment: for chronic pain    • Sexual activity: Yes     Partners: Male     Birth control/protection: Implant   Other Topics Concern   • Not on file   Social History Narrative   • Not on file     Social Determinants of Health     Financial Resource Strain: Not on file   Food Insecurity: Not on file   Transportation Needs: Not on file   Physical Activity: Not on file   Stress: Not on file   Social Connections: Not on file   Intimate Partner Violence: Not on file   Housing Stability: Not on file       Objective:     Vitals: BP (!) 92/60 (BP Location: Right arm, Patient Position: Sitting, BP Cuff Size: Adult)   Pulse 99   Temp 37.5 °C (99.5 °F) (Temporal)   Resp 16   Ht 1.582 m (5' 2.3\")   Wt 64.9 kg (143 lb)   SpO2 100%   BMI 25.90 kg/m²    General: Alert, pleasant, NAD  HEENT: Normocephalic.  Neck supple.   Respiratory: no distress, no audible wheezing, RR -WNL  Skin: Warm, dry, no rashes.  Extremities: No leg edema. No discoloration  Neurological: No tremors  Psych:  Affect/mood " is normal, judgement is good, memory is intact, grooming is appropriate.    Assessment/Plan:     Liane was seen today for medication refill.    Diagnoses and all orders for this visit:    STEVE (generalized anxiety disorder)  Stable. Occasionally uses Xanax. Last fill was over 2 years ago.  checked. Have sent a number 30 tablets with no refills to her local pharmacy. Avoid driving and drinking while on medication.  -     ALPRAZolam (XANAX) 0.25 MG Tab; Take 1 Tablet by mouth 1 time a day as needed for Anxiety (and when traveling) for up to 30 days.    Anxiety with flying  See above  -     ALPRAZolam (XANAX) 0.25 MG Tab; Take 1 Tablet by mouth 1 time a day as needed for Anxiety (and when traveling) for up to 30 days.    Adverse reaction to COVID-19 vaccine  At this time she is advised per CDC not to get the second COVID-19 vaccine.      Return for Annual Preventative Exam.          Joaquina SURESH.

## 2022-12-20 ENCOUNTER — OFFICE VISIT (OUTPATIENT)
Dept: MEDICAL GROUP | Facility: MEDICAL CENTER | Age: 32
End: 2022-12-20

## 2022-12-20 VITALS
TEMPERATURE: 98.7 F | SYSTOLIC BLOOD PRESSURE: 102 MMHG | OXYGEN SATURATION: 97 % | HEART RATE: 82 BPM | BODY MASS INDEX: 27.42 KG/M2 | DIASTOLIC BLOOD PRESSURE: 68 MMHG | HEIGHT: 62 IN | WEIGHT: 149 LBS

## 2022-12-20 DIAGNOSIS — J35.8 TONSIL STONE: ICD-10-CM

## 2022-12-20 DIAGNOSIS — J02.9 SORE THROAT: ICD-10-CM

## 2022-12-20 DIAGNOSIS — Z86.16 HISTORY OF COVID-19: ICD-10-CM

## 2022-12-20 DIAGNOSIS — K90.49 DAIRY PRODUCT INTOLERANCE: ICD-10-CM

## 2022-12-20 DIAGNOSIS — H93.8X1 IRRITATION OF RIGHT EAR: ICD-10-CM

## 2022-12-20 DIAGNOSIS — F41.1 GENERALIZED ANXIETY DISORDER: Chronic | ICD-10-CM

## 2022-12-20 PROCEDURE — 99214 OFFICE O/P EST MOD 30 MIN: CPT | Performed by: NURSE PRACTITIONER

## 2022-12-20 RX ORDER — FLUOXETINE 10 MG/1
10 CAPSULE ORAL DAILY
Qty: 90 CAPSULE | Refills: 0 | Status: SHIPPED | OUTPATIENT
Start: 2022-12-20 | End: 2023-02-28

## 2022-12-20 RX ORDER — BUPROPION HYDROCHLORIDE 300 MG/1
300 TABLET ORAL EVERY MORNING
Qty: 90 TABLET | Refills: 3 | Status: SHIPPED | OUTPATIENT
Start: 2022-12-20 | End: 2023-04-03 | Stop reason: SDUPTHER

## 2022-12-20 NOTE — PROGRESS NOTES
"Chief Complaint   Patient presents with    Anxiety     Fv       Subjective:     HPI:     Liane Martinez is a 32 y.o. female   here to discuss the evaluation and management of:     She enjoyed her trip to [place].     Had COVID before Thanksgiving. recovered now.     -Having like someone is using a feather in her R ear-bothersome for her.   No pain, no reduction of hearing, no tinnitus.    -last three days has noticed abdominal cramping and bloating after eating dairy.  Has not had this problem in the past.-No diarrhea. No vomiting. No nausea and symptoms last several hours. She is ordered some lactose pills-she is going to try.     -She believes she had a tonsil stone on the left side  Tried to extract herself but her caused some bleeding on her tonsil.   Started to gargle with salt water/peroxide    -right side of her throat she does feel like she has a sore \"feels like a canker sore\"  Started a few days ago.    She also mentions that she went to an online healthcare provider called \"[facility]\" and was started on Wellbutrin 150 mg XL daily for her anxiety.  States that she was not able to even leave the house to come to an office appointment because of her anxiety.  She felt like she \"had a bully in her head\" \" had intrusive thoughts.\"  She mentions that since starting on this medication she has had improvement of her symptoms.  She also mentions that towards the end of the day she feels as if the intrusive thoughts return.  Wondering if an increase in dose could be helpful.      ROS: : see above      Current Outpatient Medications:     buPROPion (WELLBUTRIN XL) 300 MG XL tablet, Take 1 Tablet by mouth every morning., Disp: 90 Tablet, Rfl: 3    FLUoxetine (PROZAC) 10 MG Cap, Take 1 Capsule by mouth every day., Disp: 90 Capsule, Rfl: 0    cyclobenzaprine (FLEXERIL) 10 mg Tab, Take 10 mg by mouth every 8 hours as needed., Disp: , Rfl:     HYDROcodone-acetaminophen (NORCO) 5-325 MG Tab per tablet, Take 1-2 " "Tabs by mouth every four hours as needed., Disp: , Rfl:     amitriptyline (ELAVIL) 50 MG Tab, , Disp: , Rfl:     etonogestrel (NEXPLANON) 68 MG Implant implant, Inject 1 Each as instructed Once., Disp: , Rfl:     Allergies   Allergen Reactions    Percocet [Oxycodone-Acetaminophen]     Tramadol     Tylenol Nausea    Vicodin [Hydrocodone-Acetaminophen] Vomiting    [place] Swelling       Past Medical History:   Diagnosis Date    Anxiety     Breast lump     Fibroadenoma in right breast 9-2018    Depression     Nitesh-Danlos syndrome     Muscle disorder     \"hyperflexibility\" of joints      Past Surgical History:   Procedure Laterality Date    DENTAL EXTRACTION(S)      wisdom    SEPTOPLASTY      Deviated septum repair by Dr. Jaron Guillermo     Family History   Problem Relation Age of Onset    Cancer Mother         skin    Hypertension Mother     Cancer Father         skin    Cancer Paternal Grandmother         Ovarian     Social History     Socioeconomic History    Marital status:      Spouse name: Not on file    Number of children: 0    Years of education: Not on file    Highest education level: Not on file   Occupational History    Not on file   Tobacco Use    Smoking status: Never    Smokeless tobacco: Never   Vaping Use    Vaping Use: Never used   Substance and Sexual Activity    Alcohol use: Yes     Comment: occ    Drug use: Yes     Types: Marijuana     Comment: for chronic pain     Sexual activity: Yes     Partners: Male     Birth control/protection: Implant   Other Topics Concern    Not on file   Social History Narrative    Not on file     Social Determinants of Health     Financial Resource Strain: Not on file   Food Insecurity: Not on file   Transportation Needs: Not on file   Physical Activity: Not on file   Stress: Not on file   Social Connections: Not on file   Intimate Partner Violence: Not on file   Housing Stability: Not on file       Objective:     Vitals: /68 (BP Location: Right arm, Patient " "Position: Sitting, BP Cuff Size: Adult)   Pulse 82   Temp 37.1 °C (98.7 °F) (Temporal)   Ht 1.582 m (5' 2.3\")   Wt 67.6 kg (149 lb)   SpO2 97%   BMI 26.99 kg/m²    General: Alert, pleasant, NAD  HEENT: Normocephalic.  Neck supple.  Posterior pharyngeal edema.  Respiratory: no distress, no audible wheezing, RR -WNL  Skin: Warm, dry, no rashes.  Extremities: No leg edema. No discoloration  Neurological: No tremors  Psych:  Affect/mood is normal, judgement is good, memory is intact, grooming is appropriate.    Assessment/Plan:     Liane was seen today for anxiety.    Diagnoses and all orders for this visit:    History of COVID-19  Resolved.     Tonsil stone  Acute problem, do not appreciate any tonsil stone on today's visit however it does appear to be a slightly irritated area in the left tonsil area minimal erythema.  Recommend gargle with salt water, avoid any manipulation, stop hydroperoxide.  Monitor.    Sore throat  Acute problem, very mild erythema present, no ulcerations present.  Recommend gargle with salt water.  Monitor.    Irritation of right ear  PE without any signs of infection, I rupture of TM or any debris in her EAC.  Very slight presence of serous fluid behind TM.  Recommend monitor for now.    Dairy product intolerance  New problem. Avoid trigger foods, trial of Lactaid tablets. Monitor.     STEVE (generalized anxiety disorder)  Chronic problem.  Not well controlled.  Had some improvement recently with Wellbutrin 150 mg.  Recommend increasing to 300 mg XL daily and adding on low-dose SSRI.  Patient is agreeable to this.  Start on fluoxetine 10 mg daily and follow-up in 3 months.  -     buPROPion (WELLBUTRIN XL) 300 MG XL tablet; Take 1 Tablet by mouth every morning.  -     FLUoxetine (PROZAC) 10 MG Cap; Take 1 Capsule by mouth every day.      Return in about 3 months (around 3/20/2023) for Med Check.          Joaquina PARKINSON"

## 2023-02-27 DIAGNOSIS — F41.1 GENERALIZED ANXIETY DISORDER: Chronic | ICD-10-CM

## 2023-02-28 RX ORDER — FLUOXETINE 10 MG/1
10 CAPSULE ORAL DAILY
Qty: 90 CAPSULE | Refills: 3 | Status: SHIPPED | OUTPATIENT
Start: 2023-02-28 | End: 2023-04-03 | Stop reason: SDUPTHER

## 2023-04-03 ENCOUNTER — OFFICE VISIT (OUTPATIENT)
Dept: MEDICAL GROUP | Facility: MEDICAL CENTER | Age: 33
End: 2023-04-03

## 2023-04-03 VITALS
DIASTOLIC BLOOD PRESSURE: 52 MMHG | SYSTOLIC BLOOD PRESSURE: 100 MMHG | HEIGHT: 62 IN | BODY MASS INDEX: 25.11 KG/M2 | WEIGHT: 136.47 LBS | TEMPERATURE: 98.1 F | HEART RATE: 105 BPM | OXYGEN SATURATION: 97 %

## 2023-04-03 DIAGNOSIS — R25.1 TREMOR: ICD-10-CM

## 2023-04-03 DIAGNOSIS — K59.00 CONSTIPATION, UNSPECIFIED CONSTIPATION TYPE: ICD-10-CM

## 2023-04-03 DIAGNOSIS — F41.1 GENERALIZED ANXIETY DISORDER: Chronic | ICD-10-CM

## 2023-04-03 PROCEDURE — 99214 OFFICE O/P EST MOD 30 MIN: CPT | Performed by: NURSE PRACTITIONER

## 2023-04-03 RX ORDER — FLUOXETINE 10 MG/1
10 CAPSULE ORAL DAILY
Qty: 90 CAPSULE | Refills: 3 | Status: SHIPPED | OUTPATIENT
Start: 2023-04-03 | End: 2023-06-12 | Stop reason: SDUPTHER

## 2023-04-03 RX ORDER — BUPROPION HYDROCHLORIDE 300 MG/1
300 TABLET ORAL EVERY MORNING
Qty: 90 TABLET | Refills: 3 | Status: SHIPPED | OUTPATIENT
Start: 2023-04-03 | End: 2023-06-12 | Stop reason: SDUPTHER

## 2023-04-03 ASSESSMENT — PATIENT HEALTH QUESTIONNAIRE - PHQ9: CLINICAL INTERPRETATION OF PHQ2 SCORE: 0

## 2023-04-03 NOTE — PROGRESS NOTES
"Chief Complaint   Patient presents with    Medication Management       Subjective:     HPI:     Liane Martinez is a 32 y.o. female here to for follow up    Problem   STEVE (generalized anxiety disorder)    Chronic problem.  Follow-up from last office visit where her Wellbutrin was increased to 300 mg and low-dose fluoxetine 10 mg was added.  Not emotionally eating anymore-pleased by this.   She is feeling constipated from the medications-taking fiber OTC. That is the only issue.   Has a better outlook, not as debilitating and not upsetting as much.   Traveled recently.  She also noted she started to have slight hand tremors.   Wonders if this is d/t not have enough food/sugar as this improves after eating.  No diaphoresis, or dizziness.           ROS: : see above        Current Outpatient Medications:     FLUoxetine (PROZAC) 10 MG Cap, Take 1 Capsule by mouth every day., Disp: 90 Capsule, Rfl: 3    buPROPion (WELLBUTRIN XL) 300 MG XL tablet, Take 1 Tablet by mouth every morning., Disp: 90 Tablet, Rfl: 3    cyclobenzaprine (FLEXERIL) 10 mg Tab, Take 10 mg by mouth every 8 hours as needed., Disp: , Rfl:     HYDROcodone-acetaminophen (NORCO) 5-325 MG Tab per tablet, Take 1-2 Tabs by mouth every four hours as needed., Disp: , Rfl:     amitriptyline (ELAVIL) 50 MG Tab, , Disp: , Rfl:     etonogestrel (NEXPLANON) 68 MG Implant implant, Inject 1 Each as instructed Once., Disp: , Rfl:     Allergies   Allergen Reactions    Percocet [Oxycodone-Acetaminophen]     Tramadol     Tylenol Nausea    Vicodin [Hydrocodone-Acetaminophen] Vomiting    [place] Swelling       Objective:     Vitals: /52 (BP Location: Right arm, Patient Position: Sitting, BP Cuff Size: Adult)   Pulse (!) 105   Temp 36.7 °C (98.1 °F) (Temporal)   Ht 1.575 m (5' 2\")   Wt 61.9 kg (136 lb 7.4 oz)   SpO2 97%   BMI 24.96 kg/m²    General: Alert, pleasant, NAD  HEENT: Normocephalic.  Neck supple.   Respiratory: no distress, no audible wheezing, RR " -WNL  Skin: Warm, dry, no rashes.  Extremities: No leg edema. No discoloration  Neurological: No tremors  Psych:  Affect/mood is normal, judgement is good, memory is intact, grooming is appropriate.    Assessment/Plan:     1. STEVE (generalized anxiety disorder)  Chronic. Improved since last OV and the addition of Fluoxetine 10mg and the increase of Wellbutrin to 300mg.  Recommend reducing Fluoxetine to every other day dosing to see if constipation improves while still having stable anxiety. Follow up in 3 months or sooner if needed. Refills provided.   - FLUoxetine (PROZAC) 10 MG Cap; Take 1 Capsule by mouth every day.  Dispense: 90 Capsule; Refill: 3  - buPROPion (WELLBUTRIN XL) 300 MG XL tablet; Take 1 Tablet by mouth every morning.  Dispense: 90 Tablet; Refill: 3    2. Constipation, unspecified constipation type  Possible 2/2 to new medications. She is not too bothered by this. We will reduce Fluoxetine to every other day dosing for now. Continue Fiber OTC, increased hydration.     3. Tremor  New problem. Intermittent t/o  the day-not too bothered by this. ?Possible 2/2 to medications -Fluoxetine, or lower blood sugar. She has lost weight and stopped snacking t/o day. It improves after eating.  No concerning hypoglycemic symptoms.  Monitor for now.        Return in about 3 months (around 7/3/2023).      Joaquina PARKINSON

## 2023-06-12 ENCOUNTER — OFFICE VISIT (OUTPATIENT)
Dept: MEDICAL GROUP | Facility: MEDICAL CENTER | Age: 33
End: 2023-06-12

## 2023-06-12 VITALS
BODY MASS INDEX: 23.74 KG/M2 | TEMPERATURE: 97.7 F | SYSTOLIC BLOOD PRESSURE: 98 MMHG | HEART RATE: 103 BPM | HEIGHT: 62 IN | WEIGHT: 129 LBS | OXYGEN SATURATION: 95 % | DIASTOLIC BLOOD PRESSURE: 56 MMHG

## 2023-06-12 DIAGNOSIS — K59.00 CONSTIPATION, UNSPECIFIED CONSTIPATION TYPE: ICD-10-CM

## 2023-06-12 DIAGNOSIS — F41.1 GENERALIZED ANXIETY DISORDER: ICD-10-CM

## 2023-06-12 DIAGNOSIS — R25.1 TREMOR: ICD-10-CM

## 2023-06-12 DIAGNOSIS — Z12.83 SCREENING FOR SKIN CANCER: ICD-10-CM

## 2023-06-12 DIAGNOSIS — L72.0 EPIDERMAL CYST: ICD-10-CM

## 2023-06-12 PROCEDURE — 99213 OFFICE O/P EST LOW 20 MIN: CPT | Performed by: NURSE PRACTITIONER

## 2023-06-12 PROCEDURE — 3078F DIAST BP <80 MM HG: CPT | Performed by: NURSE PRACTITIONER

## 2023-06-12 PROCEDURE — 3074F SYST BP LT 130 MM HG: CPT | Performed by: NURSE PRACTITIONER

## 2023-06-12 RX ORDER — BUPROPION HYDROCHLORIDE 300 MG/1
300 TABLET ORAL EVERY MORNING
Qty: 90 TABLET | Refills: 3 | Status: SHIPPED | OUTPATIENT
Start: 2023-06-12

## 2023-06-12 RX ORDER — CYCLOBENZAPRINE HCL 10 MG
TABLET ORAL
COMMUNITY

## 2023-06-12 RX ORDER — HYDROCODONE BITARTRATE AND ACETAMINOPHEN 5; 325 MG/1; MG/1
TABLET ORAL
COMMUNITY

## 2023-06-12 RX ORDER — FLUOXETINE 10 MG/1
10 CAPSULE ORAL DAILY
Qty: 90 CAPSULE | Refills: 3 | Status: SHIPPED | OUTPATIENT
Start: 2023-06-12

## 2023-06-12 NOTE — PROGRESS NOTES
"Chief Complaint   Patient presents with    Anxiety       Subjective:     HPI:     Liane Martinez is a 32 y.o. female here to discuss the following:    Patient presents today to follow-up on recent visit.  Her anxiety is well controlled. She tells me that she should \"have been on these medications 20 years ago\" \" feeling good\"    She tells me that her tremor that she had reported on her last office visit is better.  She noticed that she has to maintain appropriate nutrition at regular intervals and this seems to gerber the symptoms.  She also tells me she is no longer having any constipation.      She has a small epidermal like cyst on her left shoulder.  Does not appear to be infected or anything.    ROS: : see above        Current Outpatient Medications:     cyclobenzaprine (FLEXERIL) 10 mg Tab, cyclobenzaprine 10 mg tablet  Take 1 tablet 3 times a day by oral route for 30 days., Disp: , Rfl:     HYDROcodone-acetaminophen (NORCO) 5-325 MG Tab per tablet, hydrocodone 5 mg-acetaminophen 325 mg tablet  Take 1 tablet every 6 hours by oral route., Disp: , Rfl:     FLUoxetine (PROZAC) 10 MG Cap, Take 1 Capsule by mouth every day., Disp: 90 Capsule, Rfl: 3    buPROPion (WELLBUTRIN XL) 300 MG XL tablet, Take 1 Tablet by mouth every morning., Disp: 90 Tablet, Rfl: 3    HYDROcodone-acetaminophen (NORCO) 5-325 MG Tab per tablet, Take 1-2 Tabs by mouth every four hours as needed., Disp: , Rfl:     etonogestrel (NEXPLANON) 68 MG Implant implant, Inject 1 Each as instructed Once., Disp: , Rfl:     Allergies   Allergen Reactions    Percocet [Oxycodone-Acetaminophen]     Tramadol     Tylenol Nausea    Vicodin [Hydrocodone-Acetaminophen] Vomiting    [place] Swelling       Objective:     Vitals: BP 98/56 (BP Location: Right arm, Patient Position: Sitting, BP Cuff Size: Adult)   Pulse (!) 103   Temp 36.5 °C (97.7 °F) (Temporal)   Ht 1.575 m (5' 2\")   Wt 58.5 kg (129 lb)   SpO2 95%   BMI 23.59 kg/m²    General: Alert, " pleasant, NAD  HEENT: Normocephalic.  Neck supple.   Respiratory: no distress, no audible wheezing, RR -WNL  Skin: Warm, dry, no rashes.  Extremities: No leg edema. No discoloration  Neurological: No tremors  Psych:  Affect/mood is normal, judgement is good, memory is intact, grooming is appropriate.    Assessment/Plan:      1. STEVE (generalized anxiety disorder)  Chronic, very stable on Wellbutrin and Prozac at this time.  Continue current regimen.  Refill sent to her new pharmacy.  - FLUoxetine (PROZAC) 10 MG Cap; Take 1 Capsule by mouth every day.  Dispense: 90 Capsule; Refill: 3  - buPROPion (WELLBUTRIN XL) 300 MG XL tablet; Take 1 Tablet by mouth every morning.  Dispense: 90 Tablet; Refill: 3    2. Constipation, unspecified constipation type  Improved.  Continue fiber and adequate hydration.    3. Tremor  Improving.  Patient has determined that it is when she has not eaten in an appropriate manner of time she will have noticeable heart rate elevation and tremor.  Resolves after eating.  Recommend checking thyroid, fasting glucose and CBC.   - Comp Metabolic Panel; Future  - TSH WITH REFLEX TO FT4; Future  - CBC WITHOUT DIFFERENTIAL; Future    4. Epidermal cyst  - Referral to Dermatology    5. Screening for skin cancer  - Referral to Dermatology      No follow-ups on file.      Joaquina SURESH.

## 2024-03-15 NOTE — PATIENT INSTRUCTIONS
Breast Cyst  A breast cyst is a sac in the breast that is filled with fluid. Breast cysts are usually noncancerous (benign). They are common among women, and they are most often located in the upper, outer portion of the breast. One or more cysts may develop. They form when fluid builds up inside of the breast glands.  There are several types of breast cysts:  · Macrocyst. This is a cyst that is about 2 inches (5.1 cm) across (in diameter).  · Microcyst. This is a very small cyst that you cannot feel, but it can be seen with imaging tests such as an X-ray of the breast (mammogram) or ultrasound.  · Galactocele. This is a cyst that contains milk. It may develop if you suddenly stop breastfeeding.  Breast cysts do not increase your risk of breast cancer. They usually disappear after menopause, unless you take artificial hormones (are on hormone therapy).  What are the causes?  The exact cause of breast cysts is not known. Possible causes include:  · Blockage of tubes (ducts) in the breast glands, which leads to fluid buildup. Duct blockage may result from:  ¨ Fibrocystic breast changes. This is a common, benign condition that occurs when women go through hormonal changes during the menstrual cycle. This is a common cause of multiple breast cysts.  ¨ Overgrowth of breast tissue or breast glands.  ¨ Scar tissue in the breast from previous surgery.  · Changes in certain female hormones (estrogen and progesterone).  What increases the risk?  You may be more likely to develop breast cysts if you have not gone through menopause.  What are the signs or symptoms?  Symptoms of a breast cyst may include:  · Feeling one or more smooth, round, soft lumps (like grapes) in the breast that are easily moveable. The lump(s) may get bigger and more painful before your period and get smaller after your period.  · Breast discomfort or pain.  How is this diagnosed?  A cyst can be felt during a physical exam by your health care provider. A  Patient noted to have difficulty falling asleep. Noted to have fallen asleep around 0100. Non labored breathing noted while asleep. Q 7 min safety checks maintained.    mammogram and ultrasound will be done to confirm the diagnosis. Fluid may be removed from the cyst with a needle (fine-needle aspiration) and tested to make sure the cyst is not cancerous.  How is this treated?  Treatment may not be necessary. Your health care provider may monitor the cyst to see if it goes away on its own. If the cyst is uncomfortable or gets bigger, or if you do not like how the cyst makes your breast look, you may need treatment. Treatment may include:  · Hormone treatment.  · Fine-needle aspiration, to drain fluid from the cyst. There is a chance of the cyst coming back (recurring) after aspiration.  · Surgery to remove the cyst.  Follow these instructions at home:  · See your health care provider regularly.  ¨ Get a yearly physical exam.  ¨ If you are 20-40 years old, get a clinical breast exam every 1-3 years. After age 40, get this exam every year.  ¨ Get mammograms as often as directed.  · Do a breast self-exam every month, or as often as directed. Having many breast cysts, or “lumpy” breasts, may make it harder to feel for new lumps. Understand how your breasts normally look and feel, and write down any changes in your breasts so you can tell your health care provider about the changes. A breast self-exam involves:  ¨ Comparing your breasts in the mirror.  ¨ Looking for visible changes in your skin or nipples.  ¨ Feeling for lumps or changes.  · Take over-the-counter and prescription medicines only as told by your health care provider.  · Wear a supportive bra, especially when exercising.  · Follow instructions from your health care provider about eating and drinking restrictions.  ¨ Avoid caffeine.  ¨ Cut down on salt (sodium) in what you eat and drink, especially before your menstrual period. Too much sodium can cause fluid buildup (retention), breast swelling, and discomfort.  · Keep all follow-up visits as told your health care provider. This is important.  Contact a health care  provider if:  · You feel, or think you feel, a lump in your breast.  · You notice that both breasts look or feel different than usual.  · Your breast is still causing pain after your menstrual period is over.  · You find new lumps or bumps that were not there before.  · You feel lumps in your armpit (axilla).  Get help right away if:  · You have severe pain, tenderness, redness, or warmth in your breast.  · You have fluid or blood leaking from your nipple.  · Your breast lump becomes hard and painful.  · You notice dimpling or wrinkling of the breast or nipple.  This information is not intended to replace advice given to you by your health care provider. Make sure you discuss any questions you have with your health care provider.  Document Released: 12/18/2006 Document Revised: 09/08/2017 Document Reviewed: 09/08/2017  [employer] Patient Education © 2017 [employer]

## 2024-04-25 DIAGNOSIS — F41.1 GENERALIZED ANXIETY DISORDER: ICD-10-CM

## 2024-04-26 RX ORDER — BUPROPION HYDROCHLORIDE 300 MG/1
300 TABLET ORAL EVERY MORNING
Qty: 90 TABLET | Refills: 3 | Status: SHIPPED | OUTPATIENT
Start: 2024-04-26

## 2024-05-15 ENCOUNTER — OFFICE VISIT (OUTPATIENT)
Dept: MEDICAL GROUP | Facility: MEDICAL CENTER | Age: 34
End: 2024-05-15

## 2024-05-15 VITALS
BODY MASS INDEX: 24.66 KG/M2 | HEIGHT: 62 IN | WEIGHT: 134 LBS | RESPIRATION RATE: 16 BRPM | DIASTOLIC BLOOD PRESSURE: 68 MMHG | TEMPERATURE: 97.6 F | HEART RATE: 64 BPM | SYSTOLIC BLOOD PRESSURE: 110 MMHG | OXYGEN SATURATION: 97 %

## 2024-05-15 DIAGNOSIS — F41.1 GAD (GENERALIZED ANXIETY DISORDER): ICD-10-CM

## 2024-05-15 DIAGNOSIS — J31.0 CHRONIC RHINITIS: ICD-10-CM

## 2024-05-15 DIAGNOSIS — R09.82 PND (POST-NASAL DRIP): ICD-10-CM

## 2024-05-15 PROCEDURE — 99214 OFFICE O/P EST MOD 30 MIN: CPT | Performed by: NURSE PRACTITIONER

## 2024-05-15 PROCEDURE — 3074F SYST BP LT 130 MM HG: CPT | Performed by: NURSE PRACTITIONER

## 2024-05-15 PROCEDURE — 3078F DIAST BP <80 MM HG: CPT | Performed by: NURSE PRACTITIONER

## 2024-05-15 RX ORDER — AMOXICILLIN AND CLAVULANATE POTASSIUM 875; 125 MG/1; MG/1
1 TABLET, FILM COATED ORAL 2 TIMES DAILY
Qty: 20 TABLET | Refills: 0 | Status: SHIPPED | OUTPATIENT
Start: 2024-05-15

## 2024-05-15 RX ORDER — AZELASTINE 1 MG/ML
2 SPRAY, METERED NASAL 2 TIMES DAILY
Qty: 30 ML | Refills: 3 | Status: SHIPPED | OUTPATIENT
Start: 2024-05-15

## 2024-05-15 RX ORDER — ALPRAZOLAM 0.25 MG/1
0.25 TABLET ORAL
Qty: 30 TABLET | Refills: 2 | Status: SHIPPED | OUTPATIENT
Start: 2024-05-15 | End: 2024-05-16 | Stop reason: SDUPTHER

## 2024-05-15 ASSESSMENT — PATIENT HEALTH QUESTIONNAIRE - PHQ9
3. TROUBLE FALLING OR STAYING ASLEEP OR SLEEPING TOO MUCH: NEARLY EVERY DAY
7. TROUBLE CONCENTRATING ON THINGS, SUCH AS READING THE NEWSPAPER OR WATCHING TELEVISION: SEVERAL DAYS
SUM OF ALL RESPONSES TO PHQ9 QUESTIONS 1 AND 2: 2
5. POOR APPETITE OR OVEREATING: NOT AT ALL
2. FEELING DOWN, DEPRESSED, IRRITABLE, OR HOPELESS: SEVERAL DAYS
9. THOUGHTS THAT YOU WOULD BE BETTER OFF DEAD, OR OF HURTING YOURSELF: NOT AT ALL
8. MOVING OR SPEAKING SO SLOWLY THAT OTHER PEOPLE COULD HAVE NOTICED. OR THE OPPOSITE, BEING SO FIGETY OR RESTLESS THAT YOU HAVE BEEN MOVING AROUND A LOT MORE THAN USUAL: NOT AT ALL
4. FEELING TIRED OR HAVING LITTLE ENERGY: MORE THAN HALF THE DAYS
SUM OF ALL RESPONSES TO PHQ QUESTIONS 1-9: 9
6. FEELING BAD ABOUT YOURSELF - OR THAT YOU ARE A FAILURE OR HAVE LET YOURSELF OR YOUR FAMILY DOWN: SEVERAL DAYS
1. LITTLE INTEREST OR PLEASURE IN DOING THINGS: SEVERAL DAYS

## 2024-05-15 NOTE — PROGRESS NOTES
Subjective:     HPI:     Liane Martinez is a 33 y.o. female presents to discuss:   Chief Complaint   Patient presents with    Medication Refill     Presents to request a refill on Xanax- d/t stress/anxiety.  -not regulating hot temperatures very well d/t EDS so she has decided to move to [place] as this will be more climate controlled for her. She is in the process of buying a house- this has been somewhat stress full for her.     She also mentions having constant postnasal drip, not so much 'nasal congestion' she has tried allergy meds, mucinix, sudafed, Flonase, and other OTC products.   Ongoing for > 1 year.       ROS: : see above      Current Outpatient Medications:     azelastine (ASTELIN) 137 MCG/SPRAY nasal spray, Administer 2 Sprays into affected nostril(S) 2 times a day., Disp: 30 mL, Rfl: 3    amoxicillin-clavulanate (AUGMENTIN) 875-125 MG Tab, Take 1 Tablet by mouth 2 times a day., Disp: 20 Tablet, Rfl: 0    buPROPion (WELLBUTRIN XL) 300 MG XL tablet, Take 1 Tablet by mouth every morning., Disp: 90 Tablet, Rfl: 3    cyclobenzaprine (FLEXERIL) 10 mg Tab, cyclobenzaprine 10 mg tablet  Take 1 tablet 3 times a day by oral route for 30 days., Disp: , Rfl:     HYDROcodone-acetaminophen (NORCO) 5-325 MG Tab per tablet, hydrocodone 5 mg-acetaminophen 325 mg tablet  Take 1 tablet every 6 hours by oral route., Disp: , Rfl:     FLUoxetine (PROZAC) 10 MG Cap, Take 1 Capsule by mouth every day., Disp: 90 Capsule, Rfl: 3    HYDROcodone-acetaminophen (NORCO) 5-325 MG Tab per tablet, Take 1-2 Tabs by mouth every four hours as needed., Disp: , Rfl:     ALPRAZolam (XANAX) 0.25 MG Tab, Take 1 Tablet by mouth 1 time a day as needed for Anxiety (and when traveling) for up to 30 days., Disp: 30 Tablet, Rfl: 2    etonogestrel (NEXPLANON) 68 MG Implant implant, Inject 1 Each as instructed Once., Disp: , Rfl:     Allergies   Allergen Reactions    Percocet [Oxycodone-Acetaminophen]     Tramadol     Tylenol Nausea     "Vicodin [Hydrocodone-Acetaminophen] Vomiting    [place] Swelling       Objective:     Vitals: /68   Pulse 64   Temp 36.4 °C (97.6 °F)   Resp 16   Ht 1.575 m (5' 2\")   Wt 60.8 kg (134 lb)   SpO2 97%   Breastfeeding No   BMI 24.51 kg/m²      General: Alert, pleasant, NAD  HEENT: Normocephalic.  Neck supple.   Respiratory: no distress, no audible wheezing, RR -WNL  Skin: Warm, dry, no rashes.  Extremities: No leg edema. No discoloration  Neurological: No tremors  Psych:  Affect/mood is normal, judgement is good, memory is intact, grooming is appropriate.    Assessment/Plan:      1. STEVE (generalized anxiety disorder)  Chronic. Overall controlled with Wellbutrin and Fluoxetine.   Refill provided on Xanax as she has found this helpful when external stressors are present-such as her current situation buying a house.  checked. Have sent #30 tablets of Xanax to pharmacy.  -ALPRAZOLAM ( XANAX) 0.25MG TABS #30.    2. Chronic rhinitis  Chronic >1 year per patient.   Multiple OTC products per patient have not been helpful. Ll  Trial of Astelin.   She will defer referral to ENT today.  - azelastine (ASTELIN) 137 MCG/SPRAY nasal spray; Administer 2 Sprays into affected nostril(S) 2 times a day.  Dispense: 30 mL; Refill: 3  - amoxicillin-clavulanate (AUGMENTIN) 875-125 MG Tab; Take 1 Tablet by mouth 2 times a day.  Dispense: 20 Tablet; Refill: 0    3. PND (post-nasal drip)  Chronic > 1 year per patient. Per patient has tried several OTC products without significant improvement.   Recommend gargle with salt water, trial of Astelin, empirically tx with Augmentin.   Defer referral to ENT at this time.   She will consider at a later date.  - azelastine (ASTELIN) 137 MCG/SPRAY nasal spray; Administer 2 Sprays into affected nostril(S) 2 times a day.  Dispense: 30 mL; Refill: 3  - amoxicillin-clavulanate (AUGMENTIN) 875-125 MG Tab; Take 1 Tablet by mouth 2 times a day.  Dispense: 20 Tablet; Refill: 0       No " problem-specific Assessment & Plan notes found for this encounter.       Return if symptoms worsen or fail to improve.          Joaquina SURESH.

## 2024-05-16 DIAGNOSIS — F41.1 GAD (GENERALIZED ANXIETY DISORDER): ICD-10-CM

## 2024-05-16 DIAGNOSIS — F40.243 ANXIETY WITH FLYING: ICD-10-CM

## 2024-05-16 RX ORDER — ALPRAZOLAM 0.25 MG/1
0.25 TABLET ORAL
Qty: 30 TABLET | Refills: 2 | Status: SHIPPED | OUTPATIENT
Start: 2024-05-16 | End: 2024-06-15

## 2024-06-06 RX ORDER — MONTELUKAST SODIUM 10 MG/1
10 TABLET ORAL DAILY
Qty: 90 TABLET | Refills: 3 | Status: SHIPPED | OUTPATIENT
Start: 2024-06-06

## 2024-07-24 DIAGNOSIS — F41.1 GENERALIZED ANXIETY DISORDER: ICD-10-CM

## 2024-07-24 RX ORDER — FLUOXETINE 10 MG/1
10 CAPSULE ORAL DAILY
Qty: 90 CAPSULE | Refills: 3 | Status: SHIPPED | OUTPATIENT
Start: 2024-07-24

## 2024-09-11 RX ORDER — IPRATROPIUM BROMIDE 42 UG/1
2 SPRAY, METERED NASAL 3 TIMES DAILY
Qty: 15 ML | Refills: 2 | Status: SHIPPED | OUTPATIENT
Start: 2024-09-11

## 2024-10-11 RX ORDER — IPRATROPIUM BROMIDE 42 UG/1
2 SPRAY, METERED NASAL 3 TIMES DAILY
Qty: 45 ML | Refills: 1 | Status: SHIPPED | OUTPATIENT
Start: 2024-10-11

## 2024-12-23 DIAGNOSIS — F41.1 GENERALIZED ANXIETY DISORDER: ICD-10-CM

## 2024-12-23 NOTE — TELEPHONE ENCOUNTER
Received request via: Pharmacy    Was the patient seen in the last year in this department? Yes buPROPion (WELLBUTRIN XL) 300 MG XL tablet     Does the patient have an active prescription (recently filled or refills available) for medication(s) requested? No    Pharmacy Name: [facility] [place] [id number]     Does the patient have [facility] and need 100-day supply? (This applies to ALL medications) Patient does not have [facility]

## 2024-12-26 RX ORDER — BUPROPION HYDROCHLORIDE 300 MG/1
300 TABLET ORAL EVERY MORNING
Qty: 90 TABLET | Refills: 3 | Status: SHIPPED | OUTPATIENT
Start: 2024-12-26

## 2025-06-21 DIAGNOSIS — F41.1 GENERALIZED ANXIETY DISORDER: ICD-10-CM

## 2025-06-25 RX ORDER — BUPROPION HYDROCHLORIDE 300 MG/1
300 TABLET ORAL EVERY MORNING
Qty: 90 TABLET | Refills: 3 | Status: SHIPPED | OUTPATIENT
Start: 2025-06-25

## 2025-06-25 RX ORDER — FLUOXETINE 10 MG/1
10 CAPSULE ORAL DAILY
Qty: 90 CAPSULE | Refills: 3 | Status: SHIPPED | OUTPATIENT
Start: 2025-06-25